# Patient Record
Sex: MALE | Race: WHITE | NOT HISPANIC OR LATINO | Employment: OTHER | URBAN - METROPOLITAN AREA
[De-identification: names, ages, dates, MRNs, and addresses within clinical notes are randomized per-mention and may not be internally consistent; named-entity substitution may affect disease eponyms.]

---

## 2019-07-01 ENCOUNTER — OFFICE VISIT (OUTPATIENT)
Dept: NEUROLOGY | Facility: CLINIC | Age: 79
End: 2019-07-01

## 2019-07-01 ENCOUNTER — LAB REQUISITION (OUTPATIENT)
Dept: LAB | Facility: HOSPITAL | Age: 79
End: 2019-07-01

## 2019-07-01 VITALS
HEIGHT: 73 IN | WEIGHT: 210 LBS | BODY MASS INDEX: 27.83 KG/M2 | SYSTOLIC BLOOD PRESSURE: 118 MMHG | DIASTOLIC BLOOD PRESSURE: 74 MMHG

## 2019-07-01 DIAGNOSIS — R26.89 IMBALANCE: ICD-10-CM

## 2019-07-01 DIAGNOSIS — Z00.00 ROUTINE GENERAL MEDICAL EXAMINATION AT A HEALTH CARE FACILITY: ICD-10-CM

## 2019-07-01 DIAGNOSIS — R25.1 TREMOR: Primary | ICD-10-CM

## 2019-07-01 PROCEDURE — 99204 OFFICE O/P NEW MOD 45 MIN: CPT | Performed by: PSYCHIATRY & NEUROLOGY

## 2019-07-01 PROCEDURE — 36415 COLL VENOUS BLD VENIPUNCTURE: CPT | Performed by: PSYCHIATRY & NEUROLOGY

## 2019-07-01 RX ORDER — PRIMIDONE 50 MG/1
100 TABLET ORAL NIGHTLY
Qty: 120 TABLET | Refills: 2 | Status: SHIPPED | OUTPATIENT
Start: 2019-07-01 | End: 2019-10-10 | Stop reason: SDUPTHER

## 2019-07-01 RX ORDER — CEPHALEXIN 500 MG/1
CAPSULE ORAL
COMMUNITY
Start: 2019-06-27 | End: 2020-01-24

## 2019-07-01 RX ORDER — DOXAZOSIN MESYLATE 4 MG/1
TABLET ORAL
COMMUNITY
Start: 2019-04-21

## 2019-07-01 RX ORDER — PILOCARPINE HYDROCHLORIDE 10 MG/ML
SOLUTION/ DROPS OPHTHALMIC
COMMUNITY
End: 2020-12-01

## 2019-07-01 RX ORDER — CHLORTHALIDONE 25 MG/1
TABLET ORAL
COMMUNITY
Start: 2019-06-17 | End: 2020-09-29

## 2019-07-01 RX ORDER — CLONAZEPAM 0.5 MG/1
TABLET ORAL
COMMUNITY
Start: 2019-05-30 | End: 2020-09-29

## 2019-07-01 RX ORDER — BIMATOPROST 0.01 %
DROPS OPHTHALMIC (EYE)
COMMUNITY
Start: 2019-06-04 | End: 2021-03-01

## 2019-07-01 RX ORDER — DORZOLAMIDE HYDROCHLORIDE AND TIMOLOL MALEATE 20; 5 MG/ML; MG/ML
SOLUTION/ DROPS OPHTHALMIC
COMMUNITY
Start: 2019-05-17

## 2019-07-01 RX ORDER — PAROXETINE 30 MG/1
TABLET, FILM COATED ORAL
COMMUNITY
Start: 2019-04-21

## 2019-07-01 NOTE — PROGRESS NOTES
Subjective:    CC: Justin Tovar is seen today in consultation at the request of Joel Arroyo MD for Tremors       HPI:  Patient is a 79-year-old male with past medical history of depression, hypertension referred to the clinic for evaluation of tremors.  He is accompanied by his wife and daughter who help with the history.  As per the patient, he started having tremors 30 years ago.  It initially started in his right hand then eventually involve his left hand.  In the last 4 years, it has become worse and also has started involving his head.  He reports that her tremors are worse when he is holding cup of coffee or glass of water.  He is unable to write sentences the way who could before.  He also has noted difficulty using spoon and fork.  He reports strong family history of tremors in his mother and also in another family member.  He does drink alcohol on a daily basis and reports that alcohol does help significantly in reducing intensity of tremors.  He denies any episodes of acting out dreams at night.  Denies any loss of sense of smell.  He does report difficulty with walking and has had frequent falls recently.    The following portions of the patient's history were reviewed today and updated as of 07/01/2019  : allergies, social history and problem list.  This document will be scanned to patient's chart.      Current Outpatient Medications:   •  brimonidine (ALPHAGAN P) 0.1 % solution ophthalmic solution, Alphagan P 0.1 % eye drops  PLACE ONE DROP IN EACH EYE THREE TIMES A DAY, Disp: , Rfl:   •  cephalexin (KEFLEX) 500 MG capsule, , Disp: , Rfl:   •  chlorthalidone (HYGROTON) 25 MG tablet, , Disp: , Rfl:   •  clonazePAM (KlonoPIN) 0.5 MG tablet, , Disp: , Rfl:   •  dorzolamide-timolol (COSOPT) 22.3-6.8 MG/ML ophthalmic solution, , Disp: , Rfl:   •  doxazosin (CARDURA) 4 MG tablet, , Disp: , Rfl:   •  LUMIGAN 0.01 % ophthalmic drops, , Disp: , Rfl:   •  PARoxetine (PAXIL) 30 MG tablet, , Disp: ,  "Rfl:   •  pilocarpine (PILOCAR) 1 % ophthalmic solution, pilocarpine 1 % eye drops  1 drop each eye 4 times a day, Disp: , Rfl:   •  primidone (MYSOLINE) 50 MG tablet, Take 2 tablets by mouth Every Night. Take half tablet at night for 7 days then 1 tablet at night for 7 days then 1.5 tablets, Disp: 120 tablet, Rfl: 2   Past Medical History:   Diagnosis Date   • Depression    • Glaucoma    • Hypertension       History reviewed. No pertinent surgical history.   Family History   Problem Relation Age of Onset   • Alzheimer's disease Mother    • Cancer Mother    • Dementia Mother    • Cancer Father    • Heart disease Father       Review of Systems   Constitutional: Positive for appetite change, fatigue, unexpected weight gain and unexpected weight loss.   Eyes: Positive for visual disturbance.   Cardiovascular: Positive for leg swelling.   Genitourinary: Positive for erectile dysfunction.   Musculoskeletal: Positive for gait problem and neck stiffness.   Neurological: Positive for dizziness, tremors, weakness, light-headedness and confusion.   Psychiatric/Behavioral: Positive for decreased concentration and depressed mood.       All other systems reviewed and are negative     Objective:    /74   Ht 185.4 cm (73\")   Wt 95.3 kg (210 lb)   BMI 27.71 kg/m²     Neurology Exam:    General apperance: NAD.     Mental status: Alert, awake and oriented to time place and person.    Recent and Remote memory: Can recall 3/3 objects at 5 minutes. Can recall historical events.     Attention span and Concentration: Serial 7s: Normal.     Fund of knowledge:  Normal.     Language and Speech: No aphasia or dysarthria.    Naming , Repitition and Comprehension:  Can name objects, repeat a sentence and follow commands. Speech is clear and fluent with good repetition, comprehension, and naming.    Cranial Nerves:   CN II: Visual fields are full. Intact. Fundi - Normal, No papillederma, Pupils - ANDRE  CN III, IV and VI: Extraocular " movements are intact. Normal saccades.   CN V: Facial sensation is intact.   CN VII: Muscles of facial expression reveal no asymmetry. Intact.   CN VIII: Hearing is intact. Whispered voice intact.   CN IX and X: Palate elevates symmetrically. Intact  CN XI: Shoulder shrug is intact.   CN XII: Tongue is midline without evidence of atrophy or fasciculation.     Motor:  Right UE muscle strength 5/5. Normal tone.     Left UE muscle strength 5/5. Normal tone.      Right LE muscle strength5/5. Normal tone.     Left LE muscle strength 5/5. Normal tone.      Sensory: Normal light touch, vibration and pinprick sensation bilaterally.    DTRs: 2+ bilaterally in upper and lower extremities.    Babinski: Negative bilaterally.     Co-ordination: Normal finger-to-nose, heel to shin B/L.  Moderate postural and  action tremors noted.  Head tremors and yes this fashion noted as well.    Rhomberg: Negative.    Gait: Normal.  Walks slowly but has normal arm swing.  Mildly stooped posture noted.    Cardiovascular: Regular rate and rhythm without murmur, gallop or rub.    Ophthalmoscopic exam: Normal fundi, no papilledema.    Assessment and Plan:  1. Tremor  Benign essential tremor.  We will start him on primidone 25 mg at bedtime slowly increasing 100 mg dose the next 4 weeks.  If no response and no side effects and it can be increased further.  I have advised him to stop using alcohol no more than once or twice in a week.  Because of daily alcohol use for last several months, I will be checking vitamin B12 levels.  If low then it will be replaced appropriately.  - Vitamin B12; Future    2. Imbalance  He is reporting problems with balance which is exacerbated by daily alcohol use.  I have advised him to reduce alcohol intake and then preferably stop it.  I will send referral who basically gait therapy to improve gait and walking.       Return in about 8 weeks (around 8/26/2019).     Matthew Squires MD

## 2019-07-02 LAB — VIT B12 SERPL-MCNC: 966 PG/ML (ref 232–1245)

## 2019-07-05 ENCOUNTER — TELEPHONE (OUTPATIENT)
Dept: NEUROLOGY | Facility: CLINIC | Age: 79
End: 2019-07-05

## 2019-07-06 ENCOUNTER — RESULTS ENCOUNTER (OUTPATIENT)
Dept: NEUROLOGY | Facility: CLINIC | Age: 79
End: 2019-07-06

## 2019-07-06 DIAGNOSIS — R25.1 TREMOR: ICD-10-CM

## 2019-07-10 ENCOUNTER — TELEPHONE (OUTPATIENT)
Dept: NEUROLOGY | Facility: CLINIC | Age: 79
End: 2019-07-10

## 2019-07-10 NOTE — TELEPHONE ENCOUNTER
[facility] in [place] scheduled 7/22 at 1:45 PM.    Pt sister Nanda was inquiring about pt Vitamin B12 results? She states that pt becomes dizzy when he gets up sometimes and she was curious to whether or not it could be related to low B12. Please advise.

## 2019-07-10 NOTE — TELEPHONE ENCOUNTER
His vitamin B12 levels are normal at 966.  Tell him to make sure that he is well-hydrated and if he is getting dizzy when he stands up then to wait for a few seconds before he takes his first step.

## 2019-07-10 NOTE — TELEPHONE ENCOUNTER
Informed sister Nanda that vitamin b12 was normal at 966. Advised to make sure pt states hydrated and he needs to wait a few seconds before getting up if he is having dizziness. Provided appt info for PT with [facility]

## 2019-10-09 ENCOUNTER — TELEPHONE (OUTPATIENT)
Dept: NEUROLOGY | Facility: CLINIC | Age: 79
End: 2019-10-09

## 2019-10-09 NOTE — TELEPHONE ENCOUNTER
----- Message from Harjit Gordon sent at 10/9/2019  1:20 PM EDT -----  Contact: [phone]  Dr. Squires,    Pt called after missing a conformation call in regards to his appt for tomorrow 10/10. Pt states he does not want to come in but wants to continue is Rx for Primidone, stating that it has been helpful, and asked if Dr. Squires was going to increase it because he would like an increase. I informed pt that I was not sure and that if he felt the need for an increase he may want to keep his appt and discuss this with Dr. Squires. Pt states he does not want to keep the appt, he just wanted me to ask Dr. Squires to increase the Rx and for me to call him back to tell him what Dr. Squires said. I informed pt that I would send a message to Dr. Squires and that his MA would call him back. Please advise.

## 2019-10-10 RX ORDER — PRIMIDONE 50 MG/1
200 TABLET ORAL NIGHTLY
Qty: 120 TABLET | Refills: 3 | Status: SHIPPED | OUTPATIENT
Start: 2019-10-10 | End: 2020-01-24 | Stop reason: SDUPTHER

## 2019-10-10 NOTE — TELEPHONE ENCOUNTER
Tell him to increase his primidone to 3 tablets at bedtime for 7 days then 4 tablets after that.  I am sending refill for 3 months but he should see me after 3 months for follow-up otherwise insurance will not approve the medication moving forward.

## 2019-10-10 NOTE — TELEPHONE ENCOUNTER
Called pt and informed pt to increase his primidone to 3 tablets at bedtime for 7 days then 4 tablets after that. Advised  has sent refill for 3 months but he should see me after 3 months for follow-up otherwise insurance will not approve the medication moving forward. Pt acknowledged understanding.

## 2019-12-05 ENCOUNTER — TELEPHONE (OUTPATIENT)
Dept: NEUROLOGY | Facility: CLINIC | Age: 79
End: 2019-12-05

## 2019-12-05 NOTE — TELEPHONE ENCOUNTER
Called and spoke with pt who states that someone from our office already scheduled a follow-up appointment for him.

## 2019-12-05 NOTE — TELEPHONE ENCOUNTER
----- Message from Harjit Gordon sent at 12/5/2019 11:20 AM EST -----  Contact: [phone]  Dr. Squires,    Pt left a vm asking for a call back. Please advise.

## 2020-01-24 ENCOUNTER — OFFICE VISIT (OUTPATIENT)
Dept: NEUROLOGY | Facility: CLINIC | Age: 80
End: 2020-01-24

## 2020-01-24 VITALS
WEIGHT: 204 LBS | SYSTOLIC BLOOD PRESSURE: 116 MMHG | OXYGEN SATURATION: 97 % | DIASTOLIC BLOOD PRESSURE: 76 MMHG | BODY MASS INDEX: 27.04 KG/M2 | HEIGHT: 73 IN | HEART RATE: 71 BPM

## 2020-01-24 DIAGNOSIS — R25.1 TREMOR: Primary | ICD-10-CM

## 2020-01-24 PROCEDURE — 99214 OFFICE O/P EST MOD 30 MIN: CPT | Performed by: PSYCHIATRY & NEUROLOGY

## 2020-01-24 RX ORDER — PRIMIDONE 50 MG/1
TABLET ORAL
Qty: 90 TABLET | Refills: 3 | Status: SHIPPED | OUTPATIENT
Start: 2020-01-24 | End: 2020-06-29

## 2020-01-24 RX ORDER — PRIMIDONE 250 MG/1
250 TABLET ORAL NIGHTLY
Qty: 30 TABLET | Refills: 3 | Status: SHIPPED | OUTPATIENT
Start: 2020-01-24 | End: 2020-05-20

## 2020-01-24 NOTE — PROGRESS NOTES
Subjective:    CC: Justin Tovar is in clinic today for follow up for benign essential tremors.      HPI:  He is in clinic for regular follow-up.  Since her last visit, he reports that he has been taking primidone and the dose has been increased to 200 mg at bedtime.  He reports that he has had very good response and overall intensity of tremors have reduced.  He reports that prior to starting primidone, he could not write but now with primidone on board, he is at least able to write.  He still has tremors but they have reduced in intensity.  He denies any side effects with primidone use.    The following portions of the patient's history were reviewed and updated as of 01/24/2020: allergies, social history and problem list.       Current Outpatient Medications:   •  brimonidine (ALPHAGAN P) 0.1 % solution ophthalmic solution, Alphagan P 0.1 % eye drops  PLACE ONE DROP IN EACH EYE THREE TIMES A DAY, Disp: , Rfl:   •  chlorthalidone (HYGROTON) 25 MG tablet, , Disp: , Rfl:   •  clonazePAM (KlonoPIN) 0.5 MG tablet, , Disp: , Rfl:   •  dorzolamide-timolol (COSOPT) 22.3-6.8 MG/ML ophthalmic solution, , Disp: , Rfl:   •  doxazosin (CARDURA) 4 MG tablet, , Disp: , Rfl:   •  LUMIGAN 0.01 % ophthalmic drops, , Disp: , Rfl:   •  PARoxetine (PAXIL) 30 MG tablet, , Disp: , Rfl:   •  pilocarpine (PILOCAR) 1 % ophthalmic solution, pilocarpine 1 % eye drops  1 drop each eye 4 times a day, Disp: , Rfl:   •  primidone (MYSOLINE) 50 MG tablet, Take 3 tablets in the morning., Disp: 90 tablet, Rfl: 3  •  primidone (MYSOLINE) 250 MG tablet, Take 1 tablet by mouth Every Night., Disp: 30 tablet, Rfl: 3   Past Medical History:   Diagnosis Date   • Depression    • Glaucoma    • Hypertension       History reviewed. No pertinent surgical history.   Family History   Problem Relation Age of Onset   • Alzheimer's disease Mother    • Cancer Mother    • Dementia Mother    • Cancer Father    • Heart disease Father         Review of Systems  "  Neurological: Positive for tremors.   All other systems reviewed and are negative.    Objective:    /76   Pulse 71   Ht 185.4 cm (73\")   Wt 92.5 kg (204 lb)   SpO2 97%   BMI 26.91 kg/m²     Neurology Exam:  General apperance: NAD.     Mental status: Alert, awake and oriented to time place and person.    Recent and Remote memory: Can recall 3/3 objects at 5 minutes. Can recall historical events.     Attention span and Concentration: Serial 7s: Normal.     Fund of knowledge:  Normal.     Language and Speech: No aphasia or dysarthria.    Naming , Repitition and Comprehension:  Can name objects, repeat a sentence and follow commands. Speech is clear and fluent with good repetition, comprehension, and naming.    CN II to XII: Intact.    Opthalmoscopic Exam: No papilledema.    Motor:  Right UE muscle strength 5/5. Normal tone.     Left UE muscle strength 5/5. Normal tone.      Right LE muscle strength5/5. Normal tone.     Left LE muscle strength 5/5. Normal tone.      Sensory: Normal light touch, vibration and pinprick sensation bilaterally.    DTRs: 2+ bilaterally.    Babinski: Negative bilaterally.    Co-ordination: Normal finger-to-nose, heel to shin B/L.  Postural and kinetic tremors noted bilaterally-reduce in intensity from last visit.  Head tremors in yes-yes fashion noted as well. reduced in intensity as compared to last visit.    Rhomberg: Negative.    Gait: Normal.    Cardiovascular: Regular rate and rhythm without murmur, gallop or rub.    Assessment and Plan:  1. Tremor  benign essential tremors.  He has had good response to primidone at nighttime.  Currently he is on 200 mg dose.  Since he does not have any side effects, it will be increased to 250 mg at bedtime and will introduce morning dose slowly increasing from 50 mg to 150 mg in next 3 weeeks.  This will even help reduce intensity of tremors more.  So the next visit, he should be continuing 150 mg in the morning and 250 mg at night.  I will " see him back in 3 months for follow-up.       I spent 25 minutes face to face with the patient and spent more than 50% of this time  in management, instructions and education regarding above mentioned diagnosis and also on counseling and discussing about taking medication regularly, possible side effects with medication use, importance of good sleep hygiene, good hydration and regular exercise.    Return in about 3 months (around 4/24/2020).

## 2020-05-20 RX ORDER — PRIMIDONE 250 MG/1
TABLET ORAL
Qty: 30 TABLET | Refills: 2 | Status: SHIPPED | OUTPATIENT
Start: 2020-05-20 | End: 2020-06-29 | Stop reason: SDUPTHER

## 2020-06-29 ENCOUNTER — OFFICE VISIT (OUTPATIENT)
Dept: NEUROLOGY | Facility: CLINIC | Age: 80
End: 2020-06-29

## 2020-06-29 VITALS
WEIGHT: 206 LBS | HEIGHT: 73 IN | SYSTOLIC BLOOD PRESSURE: 138 MMHG | HEART RATE: 72 BPM | OXYGEN SATURATION: 98 % | DIASTOLIC BLOOD PRESSURE: 92 MMHG | TEMPERATURE: 96.9 F | BODY MASS INDEX: 27.3 KG/M2

## 2020-06-29 DIAGNOSIS — R25.1 TREMOR: Primary | ICD-10-CM

## 2020-06-29 PROCEDURE — 99214 OFFICE O/P EST MOD 30 MIN: CPT | Performed by: PSYCHIATRY & NEUROLOGY

## 2020-06-29 RX ORDER — TEMAZEPAM 30 MG/1
CAPSULE ORAL
COMMUNITY
Start: 2020-06-16

## 2020-06-29 RX ORDER — PRIMIDONE 250 MG/1
250 TABLET ORAL 2 TIMES DAILY
Qty: 60 TABLET | Refills: 3 | Status: SHIPPED | OUTPATIENT
Start: 2020-06-29 | End: 2020-09-29 | Stop reason: SDUPTHER

## 2020-06-29 NOTE — PROGRESS NOTES
Subjective:    CC: Justin Tovar is in clinic today for follow up for benign essential tremors.    HPI:    1/24/2020: He is in clinic for regular follow-up.  Since her last visit, he reports that he has been taking primidone and the dose has been increased to 200 mg at bedtime.  He reports that he has had very good response and overall intensity of tremors have reduced.  He reports that prior to starting primidone, he could not write but now with primidone on board, he is at least able to write.  He still has tremors but they have reduced in intensity.  He denies any side effects with primidone use.    6/29/2020: He is in clinic for regular follow-up.  Since his last visit in January, he reports that he has been taking primidone 150 mg in the morning at 250 mg at night.  He did well initially for couple of months but lately he reports that the tremors in right hand as well as head tremors have become somewhat worse.  He denies any side effects with primidone use.    The following portions of the patient's history were reviewed and updated as of 06/29/2020: allergies, social history and problem list.       Current Outpatient Medications:   •  brimonidine (ALPHAGAN P) 0.1 % solution ophthalmic solution, Alphagan P 0.1 % eye drops  PLACE ONE DROP IN EACH EYE THREE TIMES A DAY, Disp: , Rfl:   •  chlorthalidone (HYGROTON) 25 MG tablet, , Disp: , Rfl:   •  dorzolamide-timolol (COSOPT) 22.3-6.8 MG/ML ophthalmic solution, , Disp: , Rfl:   •  doxazosin (CARDURA) 4 MG tablet, , Disp: , Rfl:   •  LUMIGAN 0.01 % ophthalmic drops, , Disp: , Rfl:   •  PARoxetine (PAXIL) 30 MG tablet, , Disp: , Rfl:   •  pilocarpine (PILOCAR) 1 % ophthalmic solution, pilocarpine 1 % eye drops  1 drop each eye 4 times a day, Disp: , Rfl:   •  primidone (MYSOLINE) 250 MG tablet, Take 1 tablet by mouth 2 (two) times a day., Disp: 60 tablet, Rfl: 3  •  temazepam (RESTORIL) 30 MG capsule, , Disp: , Rfl:   •  clonazePAM (KlonoPIN) 0.5 MG tablet, ,  "Disp: , Rfl:    Past Medical History:   Diagnosis Date   • Depression    • Glaucoma    • Hypertension       No past surgical history on file.   Family History   Problem Relation Age of Onset   • Alzheimer's disease Mother    • Cancer Mother    • Dementia Mother    • Cancer Father    • Heart disease Father         Review of Systems   Constitutional: Negative.    HENT: Negative.    Eyes: Negative.    Respiratory: Negative.    Cardiovascular: Negative.    Gastrointestinal: Negative.    Endocrine: Negative.    Musculoskeletal: Negative.    Skin: Negative.    Allergic/Immunologic: Negative.    Neurological: Positive for tremors.   Hematological: Negative.    Psychiatric/Behavioral: Negative.      Objective:    /92   Pulse 72   Temp 96.9 °F (36.1 °C)   Ht 185.4 cm (73\")   Wt 93.4 kg (206 lb)   SpO2 98%   BMI 27.18 kg/m²     Neurology Exam:  General apperance: NAD.     Mental status: Alert, awake and oriented to time place and person.    Recent and Remote memory: Can recall 3/3 objects at 5 minutes. Can recall historical events.     Attention span and Concentration: Serial 7s: Normal.     Fund of knowledge:  Normal.     Language and Speech: No aphasia or dysarthria.    Naming , Repitition and Comprehension:  Can name objects, repeat a sentence and follow commands. Speech is clear and fluent with good repetition, comprehension, and naming.    CN II to XII: Intact.    Opthalmoscopic Exam: No papilledema.    Motor:  Right UE muscle strength 5/5. Normal tone.     Left UE muscle strength 5/5. Normal tone.      Right LE muscle strength5/5. Normal tone.     Left LE muscle strength 5/5. Normal tone.      Sensory: Normal light touch, vibration and pinprick sensation bilaterally.    DTRs: 2+ bilaterally.    Babinski: Negative bilaterally.    Co-ordination: Normal finger-to-nose, heel to shin B/L.  Moderate postural and action tremors noted in the right hand.  Mild postural and action tremors noted involving the left " hand.  Moderate intensity head tremors noted as well.    Rhomberg: Negative.    Gait: Normal.    Cardiovascular: Regular rate and rhythm without murmur, gallop or rub.    Assessment and Plan:  1. Tremor  Benign essential tremors.  Tremor seems to be worse involving the right hand and also involving the head.  Since he does not have side effects, the morning dose will be increased to 250 mg and will continue with 250 mg nightly dose.  I have advised him to call office with response in 2 to 3 weeks and based on the response, further dose adjustments will be made.  In future, afternoon dose may have to be introduced based on the current intensity of his tremors.  I plan to see him back in 3 months for follow-up.       I spent 25 minutes face to face with the patient and spent more than 50% of this time  in management, instructions and education regarding above mentioned diagnosis and also on counseling and discussing about taking medication regularly, possible side effects with medication use, importance of good sleep hygiene, good hydration and regular exercise.    Return in about 3 months (around 9/29/2020).

## 2020-09-29 ENCOUNTER — OFFICE VISIT (OUTPATIENT)
Dept: NEUROLOGY | Facility: CLINIC | Age: 80
End: 2020-09-29

## 2020-09-29 VITALS
HEART RATE: 59 BPM | WEIGHT: 188.2 LBS | DIASTOLIC BLOOD PRESSURE: 82 MMHG | SYSTOLIC BLOOD PRESSURE: 128 MMHG | BODY MASS INDEX: 24.94 KG/M2 | OXYGEN SATURATION: 98 % | HEIGHT: 73 IN | TEMPERATURE: 97.3 F

## 2020-09-29 DIAGNOSIS — R25.1 TREMOR: Primary | ICD-10-CM

## 2020-09-29 PROCEDURE — 99214 OFFICE O/P EST MOD 30 MIN: CPT | Performed by: PSYCHIATRY & NEUROLOGY

## 2020-09-29 RX ORDER — BUPROPION HYDROCHLORIDE 150 MG/1
TABLET ORAL
COMMUNITY
Start: 2020-08-31

## 2020-09-29 RX ORDER — SODIUM FLUORIDE 6 MG/ML
PASTE, DENTIFRICE DENTAL
COMMUNITY
Start: 2020-08-24 | End: 2021-07-20 | Stop reason: SDUPTHER

## 2020-09-29 RX ORDER — PRIMIDONE 50 MG/1
TABLET ORAL
Qty: 60 TABLET | Refills: 3 | Status: SHIPPED | OUTPATIENT
Start: 2020-09-29 | End: 2020-12-01

## 2020-09-29 RX ORDER — PRIMIDONE 250 MG/1
250 TABLET ORAL 2 TIMES DAILY
Qty: 60 TABLET | Refills: 3 | Status: SHIPPED | OUTPATIENT
Start: 2020-09-29 | End: 2021-02-17

## 2020-09-29 NOTE — PROGRESS NOTES
Subjective:    CC: Justin Tovar is in clinic today for follow up for benign essential tremors.    HPI:  1/24/2020: He is in clinic for regular follow-up.  Since her last visit, he reports that he has been taking primidone and the dose has been increased to 200 mg at bedtime.  He reports that he has had very good response and overall intensity of tremors have reduced.  He reports that prior to starting primidone, he could not write but now with primidone on board, he is at least able to write.  He still has tremors but they have reduced in intensity.  He denies any side effects with primidone use.    6/29/2020: He is in clinic for regular follow-up.  Since his last visit in January, he reports that he has been taking primidone 150 mg in the morning at 250 mg at night.  He did well initially for couple of months but lately he reports that the tremors in right hand as well as head tremors have become somewhat worse.  He denies any side effects with primidone use.    9/29/2020: He is in clinic for regular follow-up.  Since her last visit 3 months ago, he now is taking the primidone 250 mg in the morning and 250 mg at night.  Even after increasing the dose of primidone, he reports that the tremors has not changed much.  He continues to have difficulty with writing, difficulty when holding a cup of coffee a glass of water.  Continues to have moderate intensity head tremors as well.  He denies any side effects with primidone use.     The following portions of the patient's history were reviewed and updated as of 09/29/2020: allergies, social history and problem list.       Current Outpatient Medications:   •  brimonidine (ALPHAGAN P) 0.1 % solution ophthalmic solution, Alphagan P 0.1 % eye drops  PLACE ONE DROP IN EACH EYE THREE TIMES A DAY, Disp: , Rfl:   •  buPROPion XL (WELLBUTRIN XL) 150 MG 24 hr tablet, , Disp: , Rfl:   •  dorzolamide-timolol (COSOPT) 22.3-6.8 MG/ML ophthalmic solution, , Disp: , Rfl:   •   "doxazosin (CARDURA) 4 MG tablet, , Disp: , Rfl:   •  LUMIGAN 0.01 % ophthalmic drops, , Disp: , Rfl:   •  PARoxetine (PAXIL) 30 MG tablet, , Disp: , Rfl:   •  pilocarpine (PILOCAR) 1 % ophthalmic solution, pilocarpine 1 % eye drops  1 drop each eye 4 times a day, Disp: , Rfl:   •  primidone (MYSOLINE) 250 MG tablet, Take 1 tablet by mouth 2 (two) times a day., Disp: 60 tablet, Rfl: 3  •  Sodium Fluoride 5000 PPM 1.1 % paste, , Disp: , Rfl:   •  temazepam (RESTORIL) 30 MG capsule, , Disp: , Rfl:   •  primidone (MYSOLINE) 50 MG tablet, Take 2 tabs at noon., Disp: 60 tablet, Rfl: 3   Past Medical History:   Diagnosis Date   • Depression    • Glaucoma    • Hypertension       History reviewed. No pertinent surgical history.   Family History   Problem Relation Age of Onset   • Alzheimer's disease Mother    • Cancer Mother    • Dementia Mother    • Cancer Father    • Heart disease Father         Review of Systems   Constitutional: Negative.    HENT: Negative.    Eyes: Negative.    Respiratory: Negative.    Cardiovascular: Negative.    Gastrointestinal: Negative.    Endocrine: Negative.    Genitourinary: Negative.    Musculoskeletal: Negative.    Skin: Negative.    Allergic/Immunologic: Negative.    Neurological: Negative.    Hematological: Negative.    Psychiatric/Behavioral: Negative.      Objective:    /82   Pulse 59   Temp 97.3 °F (36.3 °C)   Ht 185.4 cm (72.99\")   Wt 85.4 kg (188 lb 3.2 oz)   SpO2 98%   BMI 24.84 kg/m²     Neurology Exam:  General apperance: NAD.     Mental status: Alert, awake and oriented to time place and person.    Recent and Remote memory: Can recall 3/3 objects at 5 minutes. Can recall historical events.     Attention span and Concentration: Serial 7s: Normal.     Fund of knowledge:  Normal.     Language and Speech: No aphasia or dysarthria.    Naming , Repitition and Comprehension:  Can name objects, repeat a sentence and follow commands. Speech is clear and fluent with good " repetition, comprehension, and naming.    CN II to XII: Intact.    Opthalmoscopic Exam: No papilledema.    Motor:  Right UE muscle strength 5/5. Normal tone.     Left UE muscle strength 5/5. Normal tone.      Right LE muscle strength5/5. Normal tone.     Left LE muscle strength 5/5. Normal tone.      Sensory: Normal light touch, vibration and pinprick sensation bilaterally.    DTRs: 2+ bilaterally.    Babinski: Negative bilaterally.    Co-ordination: Normal finger-to-nose, heel to shin B/L.  Moderate intensity hand tremors bilaterally-worse on the right than on the left and head tremors noted.    Rhomberg: Negative.    Gait: Normal.    Cardiovascular: Regular rate and rhythm without murmur, gallop or rub.    Assessment and Plan:  1. Tremor  -Both hand tremors and head tremors remains unchanged.  Since he does not have any side effects with primidone use, I will add primidone 100 mg at noon and will continue with 250 mg in the morning and 250 mg at night.  I plan to see him back in 3 months and at that time, if no response then may have to add either Topamax or propranolol to help with tremors.       I spent 25 minutes face to face with the patient and spent more than 50% of this time  in management, instructions and education regarding above mentioned diagnosis and also on counseling and discussing about taking medication regularly, possible side effects with medication use, importance of good sleep hygiene, good hydration and regular exercise.    Return in about 3 months (around 12/29/2020).

## 2020-12-01 ENCOUNTER — OFFICE VISIT (OUTPATIENT)
Dept: NEUROLOGY | Facility: CLINIC | Age: 80
End: 2020-12-01

## 2020-12-01 VITALS
BODY MASS INDEX: 25.31 KG/M2 | HEIGHT: 73 IN | OXYGEN SATURATION: 98 % | DIASTOLIC BLOOD PRESSURE: 80 MMHG | HEART RATE: 88 BPM | SYSTOLIC BLOOD PRESSURE: 140 MMHG | WEIGHT: 191 LBS | TEMPERATURE: 97.1 F

## 2020-12-01 DIAGNOSIS — R25.1 TREMOR: Primary | ICD-10-CM

## 2020-12-01 PROCEDURE — 99214 OFFICE O/P EST MOD 30 MIN: CPT | Performed by: PSYCHIATRY & NEUROLOGY

## 2020-12-01 RX ORDER — NETARSUDIL 0.2 MG/ML
SOLUTION/ DROPS OPHTHALMIC; TOPICAL
COMMUNITY
End: 2021-03-01

## 2020-12-01 NOTE — PROGRESS NOTES
Subjective:    CC: Justin Tovar is in clinic today for follow up for      HPI:  1/24/2020: He is in clinic for regular follow-up.  Since her last visit, he reports that he has been taking primidone and the dose has been increased to 200 mg at bedtime.  He reports that he has had very good response and overall intensity of tremors have reduced.  He reports that prior to starting primidone, he could not write but now with primidone on board, he is at least able to write.  He still has tremors but they have reduced in intensity.  He denies any side effects with primidone use.    6/29/2020: He is in clinic for regular follow-up.  Since his last visit in January, he reports that he has been taking primidone 150 mg in the morning at 250 mg at night.  He did well initially for couple of months but lately he reports that the tremors in right hand as well as head tremors have become somewhat worse.  He denies any side effects with primidone use.    9/29/2020: He is in clinic for regular follow-up.  Since her last visit 3 months ago, he now is taking the primidone 250 mg in the morning and 250 mg at night.  Even after increasing the dose of primidone, he reports that the tremors has not changed much.  He continues to have difficulty with writing, difficulty when holding a cup of coffee a glass of water.  Continues to have moderate intensity head tremors as well.  He denies any side effects with primidone use.    12/1/2020: He is in clinic for regular follow-up.  Since her last visit, he is now taking primidone to 50 mg in the morning, 100 mg in the afternoon and 250 mg at night.  He reports that even with this dose, there has been no change in head or hand tremors.  He is having extreme difficulty using hands well holding cup of coffee, glass of water, handwriting as well as using spoon or fork.    The following portions of the patient's history were reviewed and updated as of 12/01/2020: allergies, social history and  "problem list.       Current Outpatient Medications:   •  brimonidine (ALPHAGAN P) 0.1 % solution ophthalmic solution, Alphagan P 0.1 % eye drops  PLACE ONE DROP IN EACH EYE THREE TIMES A DAY, Disp: , Rfl:   •  buPROPion XL (WELLBUTRIN XL) 150 MG 24 hr tablet, , Disp: , Rfl:   •  dorzolamide-timolol (COSOPT) 22.3-6.8 MG/ML ophthalmic solution, , Disp: , Rfl:   •  doxazosin (CARDURA) 4 MG tablet, , Disp: , Rfl:   •  LUMIGAN 0.01 % ophthalmic drops, , Disp: , Rfl:   •  Netarsudil Dimesylate (Rhopressa) 0.02 % solution, Rhopressa 0.02 % eye drops  1 drop both eyes in evening, Disp: , Rfl:   •  PARoxetine (PAXIL) 30 MG tablet, , Disp: , Rfl:   •  primidone (MYSOLINE) 250 MG tablet, Take 1 tablet by mouth 2 (two) times a day., Disp: 60 tablet, Rfl: 3  •  Sodium Fluoride 5000 PPM 1.1 % paste, , Disp: , Rfl:   •  temazepam (RESTORIL) 30 MG capsule, , Disp: , Rfl:    Past Medical History:   Diagnosis Date   • Depression    • Glaucoma    • Hypertension       History reviewed. No pertinent surgical history.   Family History   Problem Relation Age of Onset   • Alzheimer's disease Mother    • Cancer Mother    • Dementia Mother    • Cancer Father    • Heart disease Father         Review of Systems   Constitutional: Negative.    HENT: Negative.    Eyes: Negative.    Respiratory: Negative.    Cardiovascular: Negative.    Gastrointestinal: Negative.    Endocrine: Negative.    Genitourinary: Negative.    Musculoskeletal: Negative.    Skin: Negative.    Allergic/Immunologic: Negative.    Neurological: Negative.    Hematological: Negative.    Psychiatric/Behavioral: Negative.      Objective:    /80   Pulse 88   Temp 97.1 °F (36.2 °C)   Ht 185.4 cm (72.99\")   Wt 86.6 kg (191 lb)   SpO2 98%   BMI 25.20 kg/m²     Neurology Exam:  General apperance: NAD.     Mental status: Alert, awake and oriented to time place and person.    Recent and Remote memory: Can recall 3/3 objects at 5 minutes. Can recall historical events. "     Attention span and Concentration: Serial 7s: Normal.     Fund of knowledge:  Normal.     Language and Speech: No aphasia or dysarthria.    Naming , Repitition and Comprehension:  Can name objects, repeat a sentence and follow commands. Speech is clear and fluent with good repetition, comprehension, and naming.    CN II to XII: Intact.    Opthalmoscopic Exam: No papilledema.    Motor:  Right UE muscle strength 5/5. Normal tone.     Left UE muscle strength 5/5. Normal tone.      Right LE muscle strength5/5. Normal tone.     Left LE muscle strength 5/5. Normal tone.      Sensory: Normal light touch, vibration and pinprick sensation bilaterally.    DTRs: 2+ bilaterally.    Babinski: Negative bilaterally.    Co-ordination: Normal finger-to-nose, heel to shin B/L.  Moderate to severe head and bilateral hand action and postural tremors noted.    Rhomberg: Negative.    Gait: Normal.    Cardiovascular: Regular rate and rhythm without murmur, gallop or rub.    Assessment and Plan:  1. Tremor  Intractable benign essential tremors.  He is currently on 600 mg daily dose of primidone without any reduction in tremors.  I am going to refer him to Dr. Mccormack in neurosurgery/movement disorder clinic at  for evaluation of DBS placement for treatment of intractable benign essential tremors.  Until then continue with primidone as it is and I will see him back in 3 months for follow-up.  - Ambulatory Referral to Neurosurgery       I spent 25 minutes face to face with the patient and spent more than 50% of this time  in management, instructions and education regarding above mentioned diagnosis and also on counseling and discussing about taking medication regularly, possible side effects with medication use, importance of good sleep hygiene, good hydration and regular exercise.    No follow-ups on file.

## 2021-02-17 RX ORDER — PRIMIDONE 250 MG/1
TABLET ORAL
Qty: 120 TABLET | Refills: 2 | Status: SHIPPED | OUTPATIENT
Start: 2021-02-17 | End: 2021-04-05 | Stop reason: SDUPTHER

## 2021-03-01 ENCOUNTER — OFFICE VISIT (OUTPATIENT)
Dept: NEUROLOGY | Facility: CLINIC | Age: 81
End: 2021-03-01

## 2021-03-01 VITALS
DIASTOLIC BLOOD PRESSURE: 82 MMHG | SYSTOLIC BLOOD PRESSURE: 138 MMHG | HEART RATE: 73 BPM | TEMPERATURE: 97.8 F | OXYGEN SATURATION: 98 %

## 2021-03-01 DIAGNOSIS — R26.89 IMBALANCE: ICD-10-CM

## 2021-03-01 DIAGNOSIS — R25.1 TREMOR: Primary | ICD-10-CM

## 2021-03-01 PROCEDURE — 99214 OFFICE O/P EST MOD 30 MIN: CPT | Performed by: PSYCHIATRY & NEUROLOGY

## 2021-03-01 RX ORDER — PROPRANOLOL HYDROCHLORIDE 20 MG/1
20 TABLET ORAL 3 TIMES DAILY
Qty: 90 TABLET | Refills: 3 | Status: SHIPPED | OUTPATIENT
Start: 2021-03-01 | End: 2021-07-12

## 2021-03-01 NOTE — PROGRESS NOTES
Subjective:    CC: Justin Tovar is in clinic today for follow up for tremors.    HPI:  1/24/2020: He is in clinic for regular follow-up.  Since her last visit, he reports that he has been taking primidone and the dose has been increased to 200 mg at bedtime.  He reports that he has had very good response and overall intensity of tremors have reduced.  He reports that prior to starting primidone, he could not write but now with primidone on board, he is at least able to write.  He still has tremors but they have reduced in intensity.  He denies any side effects with primidone use.    6/29/2020: He is in clinic for regular follow-up.  Since his last visit in January, he reports that he has been taking primidone 150 mg in the morning at 250 mg at night.  He did well initially for couple of months but lately he reports that the tremors in right hand as well as head tremors have become somewhat worse.  He denies any side effects with primidone use.    9/29/2020: He is in clinic for regular follow-up.  Since her last visit 3 months ago, he now is taking the primidone 250 mg in the morning and 250 mg at night.  Even after increasing the dose of primidone, he reports that the tremors has not changed much.  He continues to have difficulty with writing, difficulty when holding a cup of coffee a glass of water.  Continues to have moderate intensity head tremors as well.  He denies any side effects with primidone use.    12/1/2020: He is in clinic for regular follow-up.  Since her last visit, he is now taking primidone 250 mg in the morning, 100 mg in the afternoon and 250 mg at night.  He reports that even with this dose, there has been no change in head or hand tremors.  He is having extreme difficulty using hands well holding cup of coffee, glass of water, handwriting as well as using spoon or fork.    3/1/2021: He is in clinic for regular follow-up.  Since his last visit in December, I did send referral to [facility]  neurosurgery for possible deep brain stimulation procedure to help with treatment resistant tremors however he decided not to have the procedure done so he did not go for consultation.  He reports that for the most part with primidone 250 mg 3 times a day dose, he can function throughout the day and can maintain ADLs.  He denies any side effects with primidone use.    The following portions of the patient's history were reviewed and updated as of 03/01/2021: allergies, social history and problem list.       Current Outpatient Medications:   •  brimonidine (ALPHAGAN P) 0.1 % solution ophthalmic solution, Alphagan P 0.1 % eye drops  PLACE ONE DROP IN EACH EYE THREE TIMES A DAY, Disp: , Rfl:   •  buPROPion XL (WELLBUTRIN XL) 150 MG 24 hr tablet, , Disp: , Rfl:   •  dorzolamide-timolol (COSOPT) 22.3-6.8 MG/ML ophthalmic solution, , Disp: , Rfl:   •  doxazosin (CARDURA) 4 MG tablet, , Disp: , Rfl:   •  PARoxetine (PAXIL) 30 MG tablet, , Disp: , Rfl:   •  primidone (MYSOLINE) 250 MG tablet, TAKE ONE TABLET BY MOUTH TWICE A DAY, Disp: 120 tablet, Rfl: 2  •  Sodium Fluoride 5000 PPM 1.1 % paste, , Disp: , Rfl:   •  temazepam (RESTORIL) 30 MG capsule, , Disp: , Rfl:    Past Medical History:   Diagnosis Date   • Depression    • Glaucoma    • Hypertension       History reviewed. No pertinent surgical history.   Family History   Problem Relation Age of Onset   • Alzheimer's disease Mother    • Cancer Mother    • Dementia Mother    • Cancer Father    • Heart disease Father         Review of Systems   Constitutional: Negative.    HENT: Negative.    Eyes: Negative.    Respiratory: Negative.    Cardiovascular: Negative.    Gastrointestinal: Negative.    Endocrine: Negative.    Genitourinary: Negative.    Musculoskeletal: Negative.    Skin: Negative.    Allergic/Immunologic: Negative.    Neurological: Negative.    Hematological: Negative.    Psychiatric/Behavioral: Negative.      Objective:    /82   Pulse 73   Temp 97.8 °F  (36.6 °C)   SpO2 98%     Neurology Exam:  General apperance: NAD.     Mental status: Alert, awake and oriented to time place and person.    Recent and Remote memory: Can recall 3/3 objects at 5 minutes. Can recall historical events.     Attention span and Concentration: Serial 7s: Normal.     Fund of knowledge:  Normal.     Language and Speech: No aphasia or dysarthria.    Naming , Repitition and Comprehension:  Can name objects, repeat a sentence and follow commands. Speech is clear and fluent with good repetition, comprehension, and naming.    CN II to XII: Intact.    Opthalmoscopic Exam: No papilledema.    Motor:  Right UE muscle strength 5/5. Normal tone.     Left UE muscle strength 5/5. Normal tone.      Right LE muscle strength5/5. Normal tone.     Left LE muscle strength 5/5. Normal tone.      Sensory: Normal light touch, vibration and pinprick sensation bilaterally.    DTRs: 2+ bilaterally.    Babinski: Negative bilaterally.    Co-ordination: Normal finger-to-nose, heel to shin B/L.  Moderate intensity tremors in hand and head noted.    Rhomberg: Negative.    Gait: Normal.    Cardiovascular: Regular rate and rhythm without murmur, gallop or rub.    Assessment and Plan:  1. Tremor  2. Imbalance  Benign essential tremors.  He is currently on primidone 250 mg 3 times daily dose.  He still has moderate intensity tremors in both his hands and head.  On the last visit, neurosurgery referral was done but he change his mind and decided not to have a DBS for tremors he reports that for the most part, he is able to function throughout the day.  Since he has not tried propranolol in the past, I will be introducing propranolol 20 mg 3 times daily in addition to primidone and see how he does.  I have advised him to check blood pressure regularly while on propranolol and based on the response, further dose adjustment will be made in future.  I will plan to see him back in 3 months for follow-up.    I spent 25 minutes  face to face with the patient and spent more than 50% of this time  in management, instructions and education regarding above mentioned diagnosis and also on counseling and discussing about taking medication regularly, possible side effects with medication use, importance of good sleep hygiene, good hydration and regular exercise.    Return in about 3 months (around 6/1/2021).

## 2021-04-02 RX ORDER — PRIMIDONE 50 MG/1
TABLET ORAL
Qty: 60 TABLET | Refills: 2 | OUTPATIENT
Start: 2021-04-02

## 2021-04-05 ENCOUNTER — TELEPHONE (OUTPATIENT)
Dept: NEUROLOGY | Facility: CLINIC | Age: 81
End: 2021-04-05

## 2021-04-05 RX ORDER — PRIMIDONE 250 MG/1
250 TABLET ORAL 3 TIMES DAILY
Qty: 270 TABLET | Refills: 1 | Status: SHIPPED | OUTPATIENT
Start: 2021-04-05 | End: 2021-10-07

## 2021-04-05 NOTE — TELEPHONE ENCOUNTER
Caller: Justin Tovar    Relationship: Self    Best call back number: [phone]    What medications are you currently taking:   Current Outpatient Medications on File Prior to Visit   Medication Sig Dispense Refill   • brimonidine (ALPHAGAN P) 0.1 % solution ophthalmic solution Alphagan P 0.1 % eye drops   PLACE ONE DROP IN EACH EYE THREE TIMES A DAY     • buPROPion XL (WELLBUTRIN XL) 150 MG 24 hr tablet      • dorzolamide-timolol (COSOPT) 22.3-6.8 MG/ML ophthalmic solution      • doxazosin (CARDURA) 4 MG tablet      • PARoxetine (PAXIL) 30 MG tablet      • primidone (MYSOLINE) 250 MG tablet TAKE ONE TABLET BY MOUTH TWICE A  tablet 2   • propranolol (INDERAL) 20 MG tablet Take 1 tablet by mouth 3 (Three) Times a Day for 30 days. 90 tablet 3   • Sodium Fluoride 5000 PPM 1.1 % paste      • temazepam (RESTORIL) 30 MG capsule        No current facility-administered medications on file prior to visit.        When did you start taking these medications: NA     Which medication are you concerned about: PRIMIDONE 50MG    Who prescribed you this medication:     What are your concerns: PATIENT IS ASKING TO REFILL PRIMIDONE 50MG NOT 250MG. I DID NOT SEE THIS IN THE LIST. PLEASE ADVISE.     How long have you been taking these medications: NA    How long have you had these concerns: NA

## 2021-04-05 NOTE — TELEPHONE ENCOUNTER
I just checked my notes.  50 mg is a typo so I have changed to 250 mg in my note.  Also I am sending primidone 250 mg 1 tablet to be taken 3 times a day refills to his pharmacy.  Thanks

## 2021-04-05 NOTE — TELEPHONE ENCOUNTER
It looks like he is taking the 250 mg but in the last note it also states 50mg. I do not see it in medication list. Did you want him to still take that?

## 2021-04-06 NOTE — TELEPHONE ENCOUNTER
Caller: Justin Tovar    Relationship to patient: Self    Best call back number: [phone]    Patient is needing: CLARIFICATION ON MEDICATION DOSAGE

## 2021-04-26 ENCOUNTER — TELEPHONE (OUTPATIENT)
Dept: NEUROLOGY | Facility: CLINIC | Age: 81
End: 2021-04-26

## 2021-04-26 NOTE — TELEPHONE ENCOUNTER
Provider: DR PARKER    Caller: ALEX PACHECO    Relationship to Patient: SELF    Phone Number: [phone]    Reason for Call: THE PT CALLED IN TODAY BECAUSE HE IS NEEDING DR PARKER TO CLARIFY ON THE INSTRUCTIONS FOR HIS ROPINIROLE MEDICATION. HE STATED THE INSTRUCTIONS WANT HIM TO TAKE ONE TABLET BY MOUTH 3 TIMES A DAY AND HE IS WANTING TO KNOW EXACTLY WHEN HE IS SUPPOSE TO TAKE EACH TABLET TODAY. FOR EXAMPLE MORNING, NOON, AND NIGHT. PLEASE GIVE HIM A CALL TO DISCUSS THIS WITH HIM.

## 2021-07-12 RX ORDER — PROPRANOLOL HYDROCHLORIDE 20 MG/1
TABLET ORAL
Qty: 90 TABLET | Refills: 2 | Status: SHIPPED | OUTPATIENT
Start: 2021-07-12 | End: 2021-10-07

## 2021-07-20 ENCOUNTER — OFFICE VISIT (OUTPATIENT)
Dept: NEUROLOGY | Facility: CLINIC | Age: 81
End: 2021-07-20

## 2021-07-20 VITALS
DIASTOLIC BLOOD PRESSURE: 86 MMHG | OXYGEN SATURATION: 96 % | WEIGHT: 188 LBS | SYSTOLIC BLOOD PRESSURE: 144 MMHG | HEIGHT: 73 IN | HEART RATE: 70 BPM | BODY MASS INDEX: 24.92 KG/M2

## 2021-07-20 DIAGNOSIS — R26.89 IMBALANCE: ICD-10-CM

## 2021-07-20 DIAGNOSIS — R25.1 TREMOR: Primary | ICD-10-CM

## 2021-07-20 PROCEDURE — 99214 OFFICE O/P EST MOD 30 MIN: CPT | Performed by: PSYCHIATRY & NEUROLOGY

## 2021-07-20 RX ORDER — NETARSUDIL 0.2 MG/ML
SOLUTION/ DROPS OPHTHALMIC; TOPICAL
COMMUNITY
Start: 2021-07-07 | End: 2021-07-20 | Stop reason: SDUPTHER

## 2021-07-20 RX ORDER — 1.1% SODIUM FLUORIDE 11 MG/G
GEL DENTAL
COMMUNITY
Start: 2021-07-13

## 2021-07-20 RX ORDER — BIMATOPROST 0.01 %
DROPS OPHTHALMIC (EYE)
COMMUNITY
Start: 2021-06-03

## 2021-07-20 NOTE — PROGRESS NOTES
Subjective:    CC: Justin Tovar is in clinic today for follow up for benign essential tremors.    HPI:  1/24/2020: He is in clinic for regular follow-up.  Since her last visit, he reports that he has been taking primidone and the dose has been increased to 200 mg at bedtime.  He reports that he has had very good response and overall intensity of tremors have reduced.  He reports that prior to starting primidone, he could not write but now with primidone on board, he is at least able to write.  He still has tremors but they have reduced in intensity.  He denies any side effects with primidone use.    6/29/2020: He is in clinic for regular follow-up.  Since his last visit in January, he reports that he has been taking primidone 150 mg in the morning at 250 mg at night.  He did well initially for couple of months but lately he reports that the tremors in right hand as well as head tremors have become somewhat worse.  He denies any side effects with primidone use.    9/29/2020: He is in clinic for regular follow-up.  Since her last visit 3 months ago, he now is taking the primidone 250 mg in the morning and 250 mg at night.  Even after increasing the dose of primidone, he reports that the tremors has not changed much.  He continues to have difficulty with writing, difficulty when holding a cup of coffee a glass of water.  Continues to have moderate intensity head tremors as well.  He denies any side effects with primidone use.    12/1/2020: He is in clinic for regular follow-up.  Since her last visit, he is now taking primidone 250 mg in the morning, 100 mg in the afternoon and 250 mg at night.  He reports that even with this dose, there has been no change in head or hand tremors.  He is having extreme difficulty using hands well holding cup of coffee, glass of water, handwriting as well as using spoon or fork.    3/1/2021: He is in clinic for regular follow-up.  Since his last visit in December, I did send referral  to [facility] for possible deep brain stimulation procedure to help with treatment resistant tremors however he decided not to have the procedure done so he did not go for consultation.  He reports that for the most part with primidone 250 mg 3 times a day dose, he can function throughout the day and can maintain ADLs.  He denies any side effects with primidone use.    Follow-up: 2021-07-20: He is in clinic for regular follow-up.  Since his last visit in March, he reports that he has been taking primidone 250 mg 3 times a day along with propranolol 20 mg 3 times daily.  With addition of propranolol, he has not noticed much difference but overall he feels that with current combination, tremors are controlled enough to do activities of daily living.  He does have trouble writing but otherwise he does okay throughout the day.  He denies any side effects with propranolol use.    The following portions of the patient's history were reviewed and updated as of 07/20/2021: allergies, social history and problem list.       Current Outpatient Medications:   •  brimonidine (ALPHAGAN P) 0.1 % solution ophthalmic solution, Alphagan P 0.1 % eye drops  PLACE ONE DROP IN EACH EYE THREE TIMES A DAY, Disp: , Rfl:   •  buPROPion XL (WELLBUTRIN XL) 150 MG 24 hr tablet, , Disp: , Rfl:   •  dorzolamide-timolol (COSOPT) 22.3-6.8 MG/ML ophthalmic solution, , Disp: , Rfl:   •  doxazosin (CARDURA) 4 MG tablet, , Disp: , Rfl:   •  Lumigan 0.01 % ophthalmic drops, , Disp: , Rfl:   •  PARoxetine (PAXIL) 30 MG tablet, , Disp: , Rfl:   •  primidone (MYSOLINE) 250 MG tablet, Take 1 tablet by mouth 3 (Three) Times a Day., Disp: 270 tablet, Rfl: 1  •  propranolol (INDERAL) 20 MG tablet, TAKE ONE TABLET BY MOUTH THREE TIMES A DAY, Disp: 90 tablet, Rfl: 2  •  SF 1.1 % gel, , Disp: , Rfl:   •  temazepam (RESTORIL) 30 MG capsule, , Disp: , Rfl:    Past Medical History:   Diagnosis Date   • Depression    • Glaucoma    • Hypertension       History  "reviewed. No pertinent surgical history.   Family History   Problem Relation Age of Onset   • Alzheimer's disease Mother    • Cancer Mother    • Dementia Mother    • Cancer Father    • Heart disease Father         Review of Systems  Objective:    /86   Pulse 70   Ht 185.4 cm (72.99\")   Wt 85.3 kg (188 lb)   SpO2 96%   BMI 24.81 kg/m²     Neurology Exam:  General apperance: NAD.     Mental status: Alert, awake and oriented to time place and person.    Recent and Remote memory: Can recall 3/3 objects at 5 minutes. Can recall historical events.     Attention span and Concentration: Serial 7s: Normal.     Fund of knowledge:  Normal.     Language and Speech: No aphasia or dysarthria.    Naming , Repitition and Comprehension:  Can name objects, repeat a sentence and follow commands. Speech is clear and fluent with good repetition, comprehension, and naming.    CN II to XII: Intact.    Opthalmoscopic Exam: No papilledema.    Motor:  Right UE muscle strength 5/5. Normal tone.     Left UE muscle strength 5/5. Normal tone.      Right LE muscle strength5/5. Normal tone.     Left LE muscle strength 5/5. Normal tone.      Sensory: Normal light touch, vibration and pinprick sensation bilaterally.    DTRs: 2+ bilaterally.    Babinski: Negative bilaterally.    Co-ordination: Normal finger-to-nose, heel to shin B/L.  Moderate intensity action and postural tremors are noted bilaterally.    Rhomberg: Negative.    Gait: Normal.    Cardiovascular: Regular rate and rhythm without murmur, gallop or rub.    Assessment and Plan:  1. Tremor  He reports that with addition of propranolol 20 mg 3 times daily to primidone 250 mg daily, there has been not much reduction in the intensity of tremors.  He denies any side effects with propranolol use and blood pressure remained stable.  I have advised him to take propranolol 40 mg in the morning, 20 mg in the afternoon and 20 mg at night and continue with primidone 250 mg 3 times daily see " how he does.  I have advised him to monitor blood pressure for 1 week and I will plan to see him back in clinic in 4 months for follow-up.    I spent 30 minutes face to face with the patient and spent more than 50% of this time  in management, instructions and education regarding above mentioned diagnosis and also on counseling and discussing about taking medication regularly, possible side effects with medication use, importance of good sleep hygiene, good hydration and regular exercise.    Return in about 4 months (around 11/20/2021).

## 2021-10-07 RX ORDER — PRIMIDONE 250 MG/1
TABLET ORAL
Qty: 270 TABLET | Refills: 1 | Status: SHIPPED | OUTPATIENT
Start: 2021-10-07 | End: 2022-07-01

## 2021-10-07 RX ORDER — PROPRANOLOL HYDROCHLORIDE 20 MG/1
TABLET ORAL
Qty: 90 TABLET | Refills: 2 | Status: SHIPPED | OUTPATIENT
Start: 2021-10-07 | End: 2022-01-11

## 2022-01-11 RX ORDER — PROPRANOLOL HYDROCHLORIDE 20 MG/1
TABLET ORAL
Qty: 270 TABLET | Refills: 2 | Status: SHIPPED | OUTPATIENT
Start: 2022-01-11 | End: 2022-02-21

## 2022-02-21 RX ORDER — PROPRANOLOL HYDROCHLORIDE 20 MG/1
TABLET ORAL
Qty: 90 TABLET | Refills: 1 | Status: SHIPPED | OUTPATIENT
Start: 2022-02-21 | End: 2022-09-29 | Stop reason: SDUPTHER

## 2022-07-01 RX ORDER — PRIMIDONE 250 MG/1
TABLET ORAL
Qty: 90 TABLET | Refills: 0 | Status: SHIPPED | OUTPATIENT
Start: 2022-07-01 | End: 2022-08-01

## 2022-07-01 NOTE — TELEPHONE ENCOUNTER
Rx Refill Note  Requested Prescriptions     Pending Prescriptions Disp Refills   • primidone (MYSOLINE) 250 MG tablet [Pharmacy Med Name: PRIMIDONE 250 MG TABLET] 270 tablet 1     Sig: TAKE ONE TABLET BY MOUTH THREE TIMES A DAY      Last filled: 10/07/2021 270 with 1 refill.  Last office visit with prescribing clinician: 7/20/2021      Next office visit with prescribing clinician: Visit date not found     Sent in 90 with 0 refills.  Patient needs follow up appointment for further refills.    Lesley Pack MA  07/01/22, 10:10 EDT

## 2022-08-01 RX ORDER — PRIMIDONE 250 MG/1
TABLET ORAL
Qty: 90 TABLET | Refills: 0 | Status: SHIPPED | OUTPATIENT
Start: 2022-08-01 | End: 2022-08-31

## 2022-08-31 RX ORDER — PRIMIDONE 250 MG/1
TABLET ORAL
Qty: 90 TABLET | Refills: 0 | Status: SHIPPED | OUTPATIENT
Start: 2022-08-31 | End: 2022-09-29 | Stop reason: SDUPTHER

## 2022-09-16 ENCOUNTER — TELEPHONE (OUTPATIENT)
Dept: NEUROLOGY | Facility: CLINIC | Age: 82
End: 2022-09-16

## 2022-09-16 NOTE — TELEPHONE ENCOUNTER
Provider: ELENA THACKER  Caller: HAN PACHECO  Relationship to Patient:PT'S DAUGHTER  Phone Number: [phone]    Reason for Call: PT'S DAUGHTER CALLED STATING THAT THE PT IS HAVING A LOT OF MEMORY ISSUES GOING ON, SHE IS GOING RIGHT NOW TO PICK THE PT UP CAUSE HE DROVE TO [place] AND DON'T KNOW WHY HE DRIVE THERE FOR CAUSE HE TOLD THE OFFICER THAT HE WAS TRYING TO GO TO [facility]. THE MEMORY ISSUES HAS BEEN GOING AT LEAST A YEAR, LIKE FORGOT HOW THE CLOCK WORKS, FORGET THE WORDS THAT HE TRIES TO SAY AND GETS REAL ANXIUOS AS WELL. PT HAS APT ON 9-29-22 AND THE DAUGHTER WANTS LIKE MAYBE A TEST THAT CAN BE DONE THAT DAY?

## 2022-09-29 ENCOUNTER — OFFICE VISIT (OUTPATIENT)
Dept: NEUROLOGY | Facility: CLINIC | Age: 82
End: 2022-09-29

## 2022-09-29 VITALS
SYSTOLIC BLOOD PRESSURE: 140 MMHG | BODY MASS INDEX: 22.93 KG/M2 | OXYGEN SATURATION: 96 % | HEART RATE: 94 BPM | WEIGHT: 173 LBS | HEIGHT: 73 IN | DIASTOLIC BLOOD PRESSURE: 98 MMHG

## 2022-09-29 DIAGNOSIS — G30.1 LATE ONSET ALZHEIMER'S DEMENTIA WITHOUT BEHAVIORAL DISTURBANCE: ICD-10-CM

## 2022-09-29 DIAGNOSIS — F02.80 LATE ONSET ALZHEIMER'S DEMENTIA WITHOUT BEHAVIORAL DISTURBANCE: ICD-10-CM

## 2022-09-29 DIAGNOSIS — R25.1 TREMOR: Primary | ICD-10-CM

## 2022-09-29 PROCEDURE — 99215 OFFICE O/P EST HI 40 MIN: CPT | Performed by: PSYCHIATRY & NEUROLOGY

## 2022-09-29 RX ORDER — PRIMIDONE 250 MG/1
250 TABLET ORAL 3 TIMES DAILY
Qty: 270 TABLET | Refills: 1 | Status: SHIPPED | OUTPATIENT
Start: 2022-09-29 | End: 2022-12-28

## 2022-09-29 RX ORDER — PROPRANOLOL HYDROCHLORIDE 20 MG/1
20 TABLET ORAL 2 TIMES DAILY
Qty: 180 TABLET | Refills: 1 | Status: SHIPPED | OUTPATIENT
Start: 2022-09-29 | End: 2022-12-28

## 2022-09-29 RX ORDER — DONEPEZIL HYDROCHLORIDE 5 MG/1
5 TABLET, FILM COATED ORAL NIGHTLY
Qty: 30 TABLET | Refills: 3 | Status: SHIPPED | OUTPATIENT
Start: 2022-09-29 | End: 2023-09-29

## 2022-09-29 NOTE — PROGRESS NOTES
Subjective:    CC: Justin Tovar is in clinic today for follow up for tremors and new concern about problems with memory and cognitive decline.    HPI:  1/24/2020: He is in clinic for regular follow-up.  Since her last visit, he reports that he has been taking primidone and the dose has been increased to 200 mg at bedtime.  He reports that he has had very good response and overall intensity of tremors have reduced.  He reports that prior to starting primidone, he could not write but now with primidone on board, he is at least able to write.  He still has tremors but they have reduced in intensity.  He denies any side effects with primidone use.    6/29/2020: He is in clinic for regular follow-up.  Since his last visit in January, he reports that he has been taking primidone 150 mg in the morning at 250 mg at night.  He did well initially for couple of months but lately he reports that the tremors in right hand as well as head tremors have become somewhat worse.  He denies any side effects with primidone use.    9/29/2020: He is in clinic for regular follow-up.  Since her last visit 3 months ago, he now is taking the primidone 250 mg in the morning and 250 mg at night.  Even after increasing the dose of primidone, he reports that the tremors has not changed much.  He continues to have difficulty with writing, difficulty when holding a cup of coffee a glass of water.  Continues to have moderate intensity head tremors as well.  He denies any side effects with primidone use.    12/1/2020: He is in clinic for regular follow-up.  Since her last visit, he is now taking primidone 250 mg in the morning, 100 mg in the afternoon and 250 mg at night.  He reports that even with this dose, there has been no change in head or hand tremors.  He is having extreme difficulty using hands well holding cup of coffee, glass of water, handwriting as well as using spoon or fork.    3/1/2021: He is in clinic for regular follow-up.  Since  his last visit in December, I did send referral to  neurosurgery for possible deep brain stimulation procedure to help with treatment resistant tremors however he decided not to have the procedure done so he did not go for consultation.  He reports that for the most part with primidone 250 mg 3 times a day dose, he can function throughout the day and can maintain ADLs.  He denies any side effects with primidone use.    Follow-up: 2021-07-20: He is in clinic for regular follow-up.  Since his last visit in March, he reports that he has been taking primidone 250 mg 3 times a day along with propranolol 20 mg 3 times daily.  With addition of propranolol, he has not noticed much difference but overall he feels that with current combination, tremors are controlled enough to do activities of daily living.  He does have trouble writing but otherwise he does okay throughout the day.  He denies any side effects with propranolol use.    Follow-up: 9/29/2022: He is in clinic for regular follow-up.  Since his last visit in July 2021, he reports that he continues to take primidone 250 mg 3 times daily and has reduced the dose of propranolol to 20 mg twice daily as 20 mg 3 times daily dose was making him too sleepy.  In addition, daughter who is accompanying him reports that she has noticed a gradual decline in his memory, having episodes of confusion.  2 weeks ago, while he was driving, he got confused and got lost and ultimately, police had to be called to bring him home back.  He is having problems remembering things, misplacing things easily.  No problems with walking or balance.  No episodes of hallucinations.  He is sleeping well through the night.  He is taking Restoril 30 mg at bedtime to help with sleep.    The following portions of the patient's history were reviewed and updated as of 09/29/2022: allergies, social history and problem list.       Current Outpatient Medications:   •  brimonidine (ALPHAGAN P) 0.1 %  "solution ophthalmic solution, Alphagan P 0.1 % eye drops  PLACE ONE DROP IN EACH EYE THREE TIMES A DAY, Disp: , Rfl:   •  buPROPion XL (WELLBUTRIN XL) 150 MG 24 hr tablet, , Disp: , Rfl:   •  dorzolamide-timolol (COSOPT) 22.3-6.8 MG/ML ophthalmic solution, , Disp: , Rfl:   •  doxazosin (CARDURA) 4 MG tablet, , Disp: , Rfl:   •  Lumigan 0.01 % ophthalmic drops, , Disp: , Rfl:   •  PARoxetine (PAXIL) 30 MG tablet, , Disp: , Rfl:   •  primidone (MYSOLINE) 250 MG tablet, Take 1 tablet by mouth 3 (Three) Times a Day for 90 days., Disp: 270 tablet, Rfl: 1  •  propranolol (INDERAL) 20 MG tablet, Take 1 tablet by mouth 2 (Two) Times a Day for 90 days., Disp: 180 tablet, Rfl: 1  •  SF 1.1 % gel, , Disp: , Rfl:   •  temazepam (RESTORIL) 30 MG capsule, , Disp: , Rfl:   •  donepezil (Aricept) 5 MG tablet, Take 1 tablet by mouth Every Night., Disp: 30 tablet, Rfl: 3   Past Medical History:   Diagnosis Date   • Depression    • Glaucoma    • Hypertension       History reviewed. No pertinent surgical history.   Family History   Problem Relation Age of Onset   • Alzheimer's disease Mother    • Cancer Mother    • Dementia Mother    • Cancer Father    • Heart disease Father         Review of Systems   Neurological: Positive for memory problem.     Objective:    /98   Pulse 94   Ht 185.4 cm (73\")   Wt 78.5 kg (173 lb)   SpO2 96%   BMI 22.82 kg/m²     Neurology Exam:  General apperance: NAD.     Mental status: Alert, awake and oriented to time place and person.    Recent and Remote memory: Can recall 3/3 objects at 5 minutes. Can recall historical events.     Attention span and Concentration: Serial 7s: Normal.     Fund of knowledge:  Normal.     Language and Speech: No aphasia or dysarthria.    Naming , Repitition and Comprehension:  Can name objects, repeat a sentence and follow commands. Speech is clear and fluent with good repetition, comprehension, and naming.    CN II to XII: Intact.    Opthalmoscopic Exam: No " papilledema.    Motor:  Right UE muscle strength 5/5. Normal tone.     Left UE muscle strength 5/5. Normal tone.      Right LE muscle strength5/5. Normal tone.     Left LE muscle strength 5/5. Normal tone.      Sensory: Normal light touch, vibration and pinprick sensation bilaterally.    DTRs: 2+ bilaterally.    Babinski: Negative bilaterally.    Co-ordination: Normal finger-to-nose, heel to shin B/L.  Moderate intensity head tremors, moderate intensity postural and action tremors noted.  Very mild right hand resting tremors noted as well.    Rhomberg: Negative.    Gait: Normal.  Walks slowly but normal arm swing and normal walking stride.    Cardiovascular: Regular rate and rhythm without murmur, gallop or rub.    MMSE: 17 out of 30.    Assessment and Plan:  1. Tremor  2. Late onset Alzheimer's dementia without behavioral disturbance (HCC)  Tremors remain unchanged.  He continues to take primidone 250 mg 3 times daily and propranolol 20 mg twice daily.  This will be continued.  The most important concern since his last visit is gradual cognitive decline, episodes of confusion etc.  Today in clinic, he scored 17 out of 30 on MMSE representing mild to early moderate dementia.  I am going to start him on Aricept 5 mg at bedtime and see how he does.  If no side effects and then plan is to increase it to 10 mg in 3 months on the next visit.  I have advised him that he cannot drive.  I will plan to see him back in clinic in 3 months for follow-up.       I spent 45  minutes in patient care: Reviewing records prior to the visit, entering orders and documentation and spent more than whitt 50% of this time face-to-face in management, instructions and education regarding above mentioned diagnosis and also on counseling and discussing about taking medication regularly, possible side effects with medication use, importance of good sleep hygiene, good hydration and regular exercise.    Return in about 3 months (around 12/29/2022).

## 2023-06-15 RX ORDER — PROPRANOLOL HYDROCHLORIDE 20 MG/1
TABLET ORAL
Qty: 270 TABLET | Refills: 1 | Status: SHIPPED | OUTPATIENT
Start: 2023-06-15 | End: 2023-09-13

## 2023-06-15 NOTE — TELEPHONE ENCOUNTER
Rx Refill Note  Requested Prescriptions     Pending Prescriptions Disp Refills   • propranolol (INDERAL) 20 MG tablet [Pharmacy Med Name: PROPRANOLOL 20 MG TABLET] 270 tablet      Sig: TAKE ONE TABLET BY MOUTH THREE TIMES A DAY      Last filled: 09/29/2022, 180 with 1 Refill.   Last office visit with prescribing clinician: 9/29/2022      Next office visit with prescribing clinician: Visit date not found     Leonela Patel MA  06/15/23, 14:13 EDT

## 2023-08-14 RX ORDER — DONEPEZIL HYDROCHLORIDE 5 MG/1
TABLET, FILM COATED ORAL
Qty: 90 TABLET | Refills: 1 | Status: SHIPPED | OUTPATIENT
Start: 2023-08-14

## 2023-12-08 RX ORDER — PRIMIDONE 250 MG/1
250 TABLET ORAL 3 TIMES DAILY
Qty: 270 TABLET | Refills: 1 | OUTPATIENT
Start: 2023-12-08

## 2023-12-08 NOTE — TELEPHONE ENCOUNTER
Rx Refill Note  Requested Prescriptions     Pending Prescriptions Disp Refills    primidone (MYSOLINE) 250 MG tablet [Pharmacy Med Name: PRIMIDONE 250 MG TABLET] 270 tablet 1     Sig: TAKE ONE TABLET BY MOUTH THREE TIMES A DAY      Last filled: 9/29/22 #90 with  Last office visit with prescribing clinician: 9/29/2022      Next office visit with prescribing clinician: 1/16/2024     Ernesto Steele MA  12/08/23, 09:19 EST

## 2023-12-08 NOTE — TELEPHONE ENCOUNTER
Outgoing call to Brooke on primidone. Pt is currently taking, daughter thinks twice daily (is unsure of strength though). I confirmed as we have not sent in an rx since Sept 2022.    She stated Dec 2022 he moved to assisted living facility and we think another Dr may have taken over the rx. I advised they contact that prescriber if they need a refill as we see pt in a little over a month, and we can see about taking the rx back over then. She said he likely has enough to get to his appt with us, and that she didn't put in the refill request.         Ernesto RODRIGUEZ

## 2024-01-16 ENCOUNTER — OFFICE VISIT (OUTPATIENT)
Dept: NEUROLOGY | Facility: CLINIC | Age: 84
End: 2024-01-16
Payer: MEDICARE

## 2024-01-16 VITALS
WEIGHT: 180 LBS | HEART RATE: 65 BPM | OXYGEN SATURATION: 97 % | SYSTOLIC BLOOD PRESSURE: 118 MMHG | HEIGHT: 73 IN | BODY MASS INDEX: 23.86 KG/M2 | DIASTOLIC BLOOD PRESSURE: 78 MMHG

## 2024-01-16 DIAGNOSIS — R25.1 TREMOR: Primary | ICD-10-CM

## 2024-01-16 DIAGNOSIS — G30.1 LATE ONSET ALZHEIMER'S DEMENTIA WITHOUT BEHAVIORAL DISTURBANCE: ICD-10-CM

## 2024-01-16 DIAGNOSIS — F02.80 LATE ONSET ALZHEIMER'S DEMENTIA WITHOUT BEHAVIORAL DISTURBANCE: ICD-10-CM

## 2024-01-16 PROCEDURE — 99214 OFFICE O/P EST MOD 30 MIN: CPT | Performed by: PSYCHIATRY & NEUROLOGY

## 2024-01-16 RX ORDER — PRIMIDONE 250 MG/1
250 TABLET ORAL 2 TIMES DAILY
Qty: 180 TABLET | Refills: 1 | Status: SHIPPED | OUTPATIENT
Start: 2024-01-16 | End: 2024-04-15

## 2024-01-16 RX ORDER — PRIMIDONE 50 MG/1
100 TABLET ORAL NIGHTLY
Qty: 180 TABLET | Refills: 1 | Status: SHIPPED | OUTPATIENT
Start: 2024-01-16 | End: 2024-04-15

## 2024-01-16 RX ORDER — PROPRANOLOL HYDROCHLORIDE 20 MG/1
20 TABLET ORAL 2 TIMES DAILY
Qty: 180 TABLET | Refills: 1 | Status: SHIPPED | OUTPATIENT
Start: 2024-01-16 | End: 2024-04-15

## 2024-01-16 RX ORDER — DONEPEZIL HYDROCHLORIDE 10 MG/1
10 TABLET, FILM COATED ORAL NIGHTLY
Qty: 90 TABLET | Refills: 1 | Status: SHIPPED | OUTPATIENT
Start: 2024-01-16 | End: 2024-04-15

## 2024-01-16 NOTE — PROGRESS NOTES
Subjective:    CC: Justin Tovar is in clinic today for follow up for history of tremors and memory impairment.    HPI:  1/24/2020: He is in clinic for regular follow-up.  Since her last visit, he reports that he has been taking primidone and the dose has been increased to 200 mg at bedtime.  He reports that he has had very good response and overall intensity of tremors have reduced.  He reports that prior to starting primidone, he could not write but now with primidone on board, he is at least able to write.  He still has tremors but they have reduced in intensity.  He denies any side effects with primidone use.    6/29/2020: He is in clinic for regular follow-up.  Since his last visit in January, he reports that he has been taking primidone 150 mg in the morning at 250 mg at night.  He did well initially for couple of months but lately he reports that the tremors in right hand as well as head tremors have become somewhat worse.  He denies any side effects with primidone use.    9/29/2020: He is in clinic for regular follow-up.  Since her last visit 3 months ago, he now is taking the primidone 250 mg in the morning and 250 mg at night.  Even after increasing the dose of primidone, he reports that the tremors has not changed much.  He continues to have difficulty with writing, difficulty when holding a cup of coffee a glass of water.  Continues to have moderate intensity head tremors as well.  He denies any side effects with primidone use.    12/1/2020: He is in clinic for regular follow-up.  Since her last visit, he is now taking primidone 250 mg in the morning, 100 mg in the afternoon and 250 mg at night.  He reports that even with this dose, there has been no change in head or hand tremors.  He is having extreme difficulty using hands well holding cup of coffee, glass of water, handwriting as well as using spoon or fork.    3/1/2021: He is in clinic for regular follow-up.  Since his last visit in December, I  did send referral to [facility] for possible deep brain stimulation procedure to help with treatment resistant tremors however he decided not to have the procedure done so he did not go for consultation.  He reports that for the most part with primidone 250 mg 3 times a day dose, he can function throughout the day and can maintain ADLs.  He denies any side effects with primidone use.    Follow-up: 2021-07-20: He is in clinic for regular follow-up.  Since his last visit in March, he reports that he has been taking primidone 250 mg 3 times a day along with propranolol 20 mg 3 times daily.  With addition of propranolol, he has not noticed much difference but overall he feels that with current combination, tremors are controlled enough to do activities of daily living.  He does have trouble writing but otherwise he does okay throughout the day.  He denies any side effects with propranolol use.    Follow-up: 9/29/2022: He is in clinic for regular follow-up.  Since his last visit in July 2021, he reports that he continues to take primidone 250 mg 3 times daily and has reduced the dose of propranolol to 20 mg twice daily as 20 mg 3 times daily dose was making him too sleepy.  In addition, daughter who is accompanying him reports that she has noticed a gradual decline in his memory, having episodes of confusion.  2 weeks ago, while he was driving, he got confused and got lost and ultimately, police had to be called to bring him home back.  He is having problems remembering things, misplacing things easily.  No problems with walking or balance.  No episodes of hallucinations.  He is sleeping well through the night.  He is taking Restoril 30 mg at bedtime to help with sleep.    Follow-up: 1/16/2024: He is in clinic for regular follow-up.  He is accompanied by his daughter who helps with the history.  Since his last visit in September 2022, he reports that overall tremors and memory have remained stable.  He is however  not taking the afternoon dose of primidone.  He takes 250 mg in the morning 250 mg at night.  He is also taking propranolol 20 mg twice daily.  He does report that some days, tremors are worse and it affects his ability to use spoon or fork.  He denies any side effects with this combination.  Memory has remained stable with use of Aricept 5 mg daily.  He denies any side effects with Aricept use.      The following portions of the patient's history were reviewed and updated as of 01/16/2024: allergies, social history, and problem list.       Current Outpatient Medications:     brimonidine (ALPHAGAN P) 0.1 % solution ophthalmic solution, Alphagan P 0.1 % eye drops  PLACE ONE DROP IN EACH EYE THREE TIMES A DAY, Disp: , Rfl:     donepezil (ARICEPT) 10 MG tablet, Take 1 tablet by mouth Every Night for 90 days., Disp: 90 tablet, Rfl: 1    dorzolamide-timolol (COSOPT) 22.3-6.8 MG/ML ophthalmic solution, , Disp: , Rfl:     doxazosin (CARDURA) 4 MG tablet, Take 1 tablet by mouth Daily., Disp: , Rfl:     Lumigan 0.01 % ophthalmic drops, , Disp: , Rfl:     PARoxetine (PAXIL) 30 MG tablet, Take 1 tablet by mouth 2 (Two) Times a Day., Disp: , Rfl:     primidone (MYSOLINE) 250 MG tablet, Take 1 tablet by mouth 2 (Two) Times a Day for 90 days., Disp: 180 tablet, Rfl: 1    propranolol (INDERAL) 20 MG tablet, Take 1 tablet by mouth 2 (Two) Times a Day for 90 days., Disp: 180 tablet, Rfl: 1    SF 1.1 % gel, , Disp: , Rfl:     temazepam (RESTORIL) 30 MG capsule, Take 1 capsule by mouth Daily., Disp: , Rfl:     primidone (MYSOLINE) 50 MG tablet, Take 2 tablets by mouth Every Night for 90 days., Disp: 180 tablet, Rfl: 1   Past Medical History:   Diagnosis Date    Depression     Glaucoma     Hypertension       No past surgical history on file.   Family History   Problem Relation Age of Onset    Alzheimer's disease Mother     Cancer Mother     Dementia Mother     Cancer Father     Heart disease Father         Review of  "Systems  Objective:    /78   Pulse 65   Ht 185.4 cm (73\") Comment: self reported  Wt 81.6 kg (180 lb) Comment: self reported  SpO2 97%   BMI 23.75 kg/m²     Neurology Exam:  General apperance: NAD.     Mental status: Alert, awake and oriented to time place and person.    Language and Speech: No aphasia or dysarthria.    CN II to XII: Intact.    Opthalmoscopic Exam: No papilledema.    Motor:  Right UE muscle strength 5/5. Normal tone.     Left UE muscle strength 5/5. Normal tone.      Right LE muscle strength 5/5. Normal tone.     Left LE muscle strength 5/5. Normal tone.      Sensory: Normal light touch, vibration and pinprick sensation bilaterally.    DTRs: 2+ bilaterally.    Babinski: Negative bilaterally.    Co-ordination: Normal finger-to-nose, heel to shin B/L.    Mild to moderate intensity action and postural tremors noted bilaterally.  Mild head tremors noted as well.    Rhomberg: Negative.    Gait: Normal.    Cardiovascular: Regular rate and rhythm without murmur, gallop or rub.    Assessment and Plan:  1. Tremor  2. Late onset Alzheimer's dementia without behavioral disturbance  -For the most part, tremors are stable however he does experience worsening in tremor some days and is difficult for him to use spoon or fork those days.  He was taking primidone 250 mg 3 times daily but currently is unable to take afternoon dose.  I will be increasing the nighttime dose to 350 mg and will continue 250 mg in the morning and see how he does.  Continue with propranolol 20 mg twice daily.  Weight has remained stable since he does not have any side effects with Aricept use, I will be increasing it to 10 mg daily for better therapeutic effect.  On last visit, he had scored 19/30 on MMSE.  Otherwise, I will see him back in clinic in 6 months for follow-up.       I spent 30 minutes in patient care: Reviewing records prior to the visit, entering orders and documentation and spent more than whitt 50% of this time " face-to-face in management, instructions and education regarding above mentioned diagnosis and also on counseling and discussing about taking medication regularly, possible side effects with medication use, importance of good sleep hygiene, good hydration and regular exercise.    Return in about 6 months (around 7/16/2024).       Note to patient: The 21st Century Cures Act makes medical notes like these available to patients in the interest of transparency. However, be advised this is a medical document. It is intended as peer to peer communication. It is written in medical language and may contain abbreviations or verbiage that are unfamiliar. It may appear blunt or direct. Medical documents are intended to carry relevant information, facts as evident, and the clinical opinion of the physician.

## 2024-03-12 NOTE — TELEPHONE ENCOUNTER
Caller: Han Tovar    Relationship: Emergency Contact    Best call back number: [phone]    Requested Prescriptions:   Requested Prescriptions     Pending Prescriptions Disp Refills    donepezil (ARICEPT) 5 MG tablet [Pharmacy Med Name: DONEPEZIL HCL 5 MG TABLET] 30 tablet 3     Sig: TAKE ONE TABLET BY MOUTH ONCE NIGHTLY        Pharmacy where request should be sent: [facility] [id number] [address] & [facility] [phone] [facility] [phone] FX     Last office visit with prescribing clinician: 9/29/2022   Last telemedicine visit with prescribing clinician: Visit date not found   Next office visit with prescribing clinician: 1/16/2024     Additional details provided by patient: PT HAS BEEN TAKING THIS RX PER HIS DAUGHTER HAN. HE JUST RAN OUT LAST NIGHT. [facility] WAS IN [facility] AND SHE THOUGHT THAT MAYBE HE WAS GETTING THE RX THROUGH [facility] BUT PT IS NOW BACK AT HIS HOME.    PT HAS BEEN SCHEDULE FOR 1ST AVAILABLE AND IS ON THE WAIT LIST FOR SOONER APPT.    Does the patient have less than a 3 day supply:  [x] Yes  [] No    Would you like a call back once the refill request has been completed: [] Yes [] No    If the office needs to give you a call back, can they leave a voicemail: [] Yes [] No    Faith Marie Rep   08/14/23 12:28 EDT   
Received refill request for pt's aricept. Pt is overdue/would have been out for some time. Pt was also last seen in Sept last yr-- office notes say Dr. Squires wanted to see him back 3 mos later.    Called to see if pt is still taking med/if he needs it & to try and schedule appt. Jani 1st available is not until Jan 16th. Offered that date w/ times and offered to put pt on waitlist as well.   
Rx Refill Note  Requested Prescriptions     Pending Prescriptions Disp Refills    donepezil (ARICEPT) 5 MG tablet [Pharmacy Med Name: DONEPEZIL HCL 5 MG TABLET] 30 tablet 3     Sig: TAKE ONE TABLET BY MOUTH ONCE NIGHTLY      Last filled:   Last office visit with prescribing clinician: 9/29/2022      Next office visit with prescribing clinician: Visit date not found     Ernesto Steele MA  08/14/23, 10:50 EDT  
5

## 2024-07-16 ENCOUNTER — OFFICE VISIT (OUTPATIENT)
Dept: NEUROLOGY | Facility: CLINIC | Age: 84
End: 2024-07-16
Payer: MEDICARE

## 2024-07-16 ENCOUNTER — TELEPHONE (OUTPATIENT)
Dept: NEUROLOGY | Facility: CLINIC | Age: 84
End: 2024-07-16

## 2024-07-16 VITALS
SYSTOLIC BLOOD PRESSURE: 118 MMHG | DIASTOLIC BLOOD PRESSURE: 70 MMHG | BODY MASS INDEX: 23.86 KG/M2 | OXYGEN SATURATION: 95 % | HEART RATE: 68 BPM | WEIGHT: 180 LBS | HEIGHT: 73 IN

## 2024-07-16 DIAGNOSIS — G30.1 LATE ONSET ALZHEIMER'S DEMENTIA WITHOUT BEHAVIORAL DISTURBANCE: ICD-10-CM

## 2024-07-16 DIAGNOSIS — F02.80 LATE ONSET ALZHEIMER'S DEMENTIA WITHOUT BEHAVIORAL DISTURBANCE: ICD-10-CM

## 2024-07-16 DIAGNOSIS — R25.1 TREMOR: Primary | ICD-10-CM

## 2024-07-16 PROCEDURE — 1159F MED LIST DOCD IN RCRD: CPT | Performed by: PSYCHIATRY & NEUROLOGY

## 2024-07-16 PROCEDURE — 1160F RVW MEDS BY RX/DR IN RCRD: CPT | Performed by: PSYCHIATRY & NEUROLOGY

## 2024-07-16 PROCEDURE — 99214 OFFICE O/P EST MOD 30 MIN: CPT | Performed by: PSYCHIATRY & NEUROLOGY

## 2024-07-16 RX ORDER — PRIMIDONE 50 MG/1
TABLET ORAL
Qty: 180 TABLET | Refills: 1 | OUTPATIENT
Start: 2024-07-16

## 2024-07-16 RX ORDER — DONEPEZIL HYDROCHLORIDE 10 MG/1
10 TABLET, FILM COATED ORAL NIGHTLY
Qty: 90 TABLET | Refills: 1 | OUTPATIENT
Start: 2024-07-16

## 2024-07-16 RX ORDER — PRIMIDONE 250 MG/1
250 TABLET ORAL 2 TIMES DAILY
Qty: 180 TABLET | Refills: 1 | OUTPATIENT
Start: 2024-07-16

## 2024-07-16 NOTE — TELEPHONE ENCOUNTER
While in clinic today,  printed off [facility] parking placard and signed for Justin to have a handicapped parking placard for 10 years. Handed to daughter, Brooke at check out.

## 2024-07-16 NOTE — PROGRESS NOTES
Subjective:    CC: Justin Tovar is in clinic today for follow up for tremors and memory impairment.    HPI:  1/24/2020: He is in clinic for regular follow-up.  Since her last visit, he reports that he has been taking primidone and the dose has been increased to 200 mg at bedtime.  He reports that he has had very good response and overall intensity of tremors have reduced.  He reports that prior to starting primidone, he could not write but now with primidone on board, he is at least able to write.  He still has tremors but they have reduced in intensity.  He denies any side effects with primidone use.    6/29/2020: He is in clinic for regular follow-up.  Since his last visit in January, he reports that he has been taking primidone 150 mg in the morning at 250 mg at night.  He did well initially for couple of months but lately he reports that the tremors in right hand as well as head tremors have become somewhat worse.  He denies any side effects with primidone use.    9/29/2020: He is in clinic for regular follow-up.  Since her last visit 3 months ago, he now is taking the primidone 250 mg in the morning and 250 mg at night.  Even after increasing the dose of primidone, he reports that the tremors has not changed much.  He continues to have difficulty with writing, difficulty when holding a cup of coffee a glass of water.  Continues to have moderate intensity head tremors as well.  He denies any side effects with primidone use.    12/1/2020: He is in clinic for regular follow-up.  Since her last visit, he is now taking primidone 250 mg in the morning, 100 mg in the afternoon and 250 mg at night.  He reports that even with this dose, there has been no change in head or hand tremors.  He is having extreme difficulty using hands well holding cup of coffee, glass of water, handwriting as well as using spoon or fork.    3/1/2021: He is in clinic for regular follow-up.  Since his last visit in December, I did send  referral to [facility] for possible deep brain stimulation procedure to help with treatment resistant tremors however he decided not to have the procedure done so he did not go for consultation.  He reports that for the most part with primidone 250 mg 3 times a day dose, he can function throughout the day and can maintain ADLs.  He denies any side effects with primidone use.    Follow-up: 2021-07-20: He is in clinic for regular follow-up.  Since his last visit in March, he reports that he has been taking primidone 250 mg 3 times a day along with propranolol 20 mg 3 times daily.  With addition of propranolol, he has not noticed much difference but overall he feels that with current combination, tremors are controlled enough to do activities of daily living.  He does have trouble writing but otherwise he does okay throughout the day.  He denies any side effects with propranolol use.    Follow-up: 9/29/2022: He is in clinic for regular follow-up.  Since his last visit in July 2021, he reports that he continues to take primidone 250 mg 3 times daily and has reduced the dose of propranolol to 20 mg twice daily as 20 mg 3 times daily dose was making him too sleepy.  In addition, daughter who is accompanying him reports that she has noticed a gradual decline in his memory, having episodes of confusion.  2 weeks ago, while he was driving, he got confused and got lost and ultimately, police had to be called to bring him home back.  He is having problems remembering things, misplacing things easily.  No problems with walking or balance.  No episodes of hallucinations.  He is sleeping well through the night.  He is taking Restoril 30 mg at bedtime to help with sleep.    Follow-up: 1/16/2024: He is in clinic for regular follow-up.  He is accompanied by his daughter who helps with the history.  Since his last visit in September 2022, he reports that overall tremors and memory have remained stable.  He is however not taking  the afternoon dose of primidone.  He takes 250 mg in the morning 250 mg at night.  He is also taking propranolol 20 mg twice daily.  He does report that some days, tremors are worse and it affects his ability to use spoon or fork.  He denies any side effects with this combination.  Memory has remained stable with use of Aricept 5 mg daily.  He denies any side effects with Aricept use.    Follow-up: 7/16/2024: In clinic for regular follow-up.  He is accompanied by his daughter who helps with the history.  Since his last visit 6 months ago, overall memory is stable.  He continues to have tremors but they are not any worse than what it was.  He is unable to take 50 mg tablet of primidone as it is too small for him to grasp in his hands.  He is currently taking primidone to 50 mg twice daily and propranolol 20 mg twice daily.  He does have visual hallucination and sees some people entering the house but they are not frightening.  He is taking Aricept to 10 mg daily.    The following portions of the patient's history were reviewed and updated as of 07/16/2024: allergies, social history, and problem list.       Current Outpatient Medications:     brimonidine (ALPHAGAN P) 0.1 % solution ophthalmic solution, Alphagan P 0.1 % eye drops  PLACE ONE DROP IN EACH EYE THREE TIMES A DAY, Disp: , Rfl:     dorzolamide-timolol (COSOPT) 22.3-6.8 MG/ML ophthalmic solution, , Disp: , Rfl:     doxazosin (CARDURA) 4 MG tablet, Take 1 tablet by mouth Daily., Disp: , Rfl:     PARoxetine (PAXIL) 30 MG tablet, Take 1 tablet by mouth 2 (Two) Times a Day., Disp: , Rfl:     temazepam (RESTORIL) 30 MG capsule, Take 1 capsule by mouth Daily., Disp: , Rfl:     donepezil (ARICEPT) 10 MG tablet, Take 1 tablet by mouth Every Night for 90 days., Disp: 90 tablet, Rfl: 1    Lumigan 0.01 % ophthalmic drops, , Disp: , Rfl:     primidone (MYSOLINE) 250 MG tablet, Take 1 tablet by mouth 2 (Two) Times a Day for 90 days., Disp: 180 tablet, Rfl: 1    primidone  "(MYSOLINE) 50 MG tablet, Take 2 tablets by mouth Every Night for 90 days., Disp: 180 tablet, Rfl: 1    propranolol (INDERAL) 20 MG tablet, Take 1 tablet by mouth 2 (Two) Times a Day for 90 days., Disp: 180 tablet, Rfl: 1    SF 1.1 % gel, , Disp: , Rfl:    Past Medical History:   Diagnosis Date    Depression     Glaucoma     Hypertension       History reviewed. No pertinent surgical history.   Family History   Problem Relation Age of Onset    Alzheimer's disease Mother     Cancer Mother     Dementia Mother     Cancer Father     Heart disease Father         Review of Systems  Objective:    /70   Pulse 68   Ht 185.4 cm (73\")   Wt 81.6 kg (180 lb)   SpO2 95%   BMI 23.75 kg/m²     Neurology Exam:  General apperance: NAD.     Mental status: Alert, awake and oriented to time place and person.    Language and Speech: No aphasia or dysarthria.    CN II to XII: Intact.    Opthalmoscopic Exam: No papilledema.    Motor:  Right UE muscle strength 5/5. Normal tone.     Left UE muscle strength 5/5. Normal tone.      Right LE muscle strength 5/5. Normal tone.     Left LE muscle strength 5/5. Normal tone.      Sensory: Normal light touch, vibration and pinprick sensation bilaterally.    DTRs: 2+ bilaterally.    Babinski: Negative bilaterally.    Co-ordination: Normal finger-to-nose, heel to shin B/L.    Resting and action tremors bilaterally.    Rhomberg: Negative.    Gait: Normal.    Cardiovascular: Regular rate and rhythm without murmur, gallop or rub.    Assessment and Plan:  1. Tremor  2. Late onset Alzheimer's dementia without behavioral disturbance  Overall memory has remained stable.  Continue with Aricept 10 mg daily.  He is unable to take extra 100 mg of primidone at night so I have advised him to take primidone to 50 mg in the morning and 375 mg (1.5 tablets of 250 mg) at night and see if it helps reduce frequency and intensity of tremors further.  He does report some visual hallucinations but they are not " frightening so that is reassuring.  Otherwise, I will see him back in clinic in 6 months for follow-up and we will plan to repeat MMSE at that time.       I spent 30 minutes in patient care: Reviewing records prior to the visit, entering orders and documentation and spent more than whitt 50% of this time face-to-face in management, instructions and education regarding above mentioned diagnosis and also on counseling and discussing about taking medication regularly, possible side effects with medication use, importance of good sleep hygiene, good hydration and regular exercise.    No follow-ups on file.       Note to patient: The 21st Century Cures Act makes medical notes like these available to patients in the interest of transparency. However, be advised this is a medical document. It is intended as peer to peer communication. It is written in medical language and may contain abbreviations or verbiage that are unfamiliar. It may appear blunt or direct. Medical documents are intended to carry relevant information, facts as evident, and the clinical opinion of the physician.

## 2024-07-29 RX ORDER — DONEPEZIL HYDROCHLORIDE 10 MG/1
10 TABLET, FILM COATED ORAL NIGHTLY
Qty: 90 TABLET | Refills: 1 | Status: SHIPPED | OUTPATIENT
Start: 2024-07-29

## 2024-07-29 RX ORDER — PROPRANOLOL HYDROCHLORIDE 20 MG/1
20 TABLET ORAL 2 TIMES DAILY
Qty: 180 TABLET | Refills: 1 | Status: SHIPPED | OUTPATIENT
Start: 2024-07-29

## 2024-07-29 RX ORDER — PRIMIDONE 250 MG/1
TABLET ORAL
Qty: 225 TABLET | Refills: 1 | Status: SHIPPED | OUTPATIENT
Start: 2024-07-29

## 2024-07-29 NOTE — TELEPHONE ENCOUNTER
Per appt 7/16/24 primidone dose adjusted to 250mg in the morning and 375mg at night-- pt was unable to take the extra 50mg tabs for his evening dose as the tablets were too small to . Continue Aricept 10mg daily. No change to propranolol 20mg twice daily.     All 3 meds last filled 1/16/24 for 6 months total, each. Sending another 6 months of all 3 meds and removed the 50mg tabs from his chart with note of dose adjustment.     JENNIFER Orozco

## 2024-12-02 NOTE — TELEPHONE ENCOUNTER
Need to call pt and confirm dosing of primidone. Not sure how he has been taking, he had to adjust after his appt this summer, this rx request is not for the most updated dose he reported he was on.

## 2024-12-02 NOTE — TELEPHONE ENCOUNTER
Rx Refill Note  Requested Prescriptions     Pending Prescriptions Disp Refills    donepezil (ARICEPT) 10 MG tablet [Pharmacy Med Name: DONEPEZIL HCL 10 MG TABLET] 90 tablet 1     Sig: TAKE ONE TABLET BY MOUTH ONCE NIGHTLY    primidone (MYSOLINE) 250 MG tablet [Pharmacy Med Name: PRIMIDONE 250 MG TABLET] 225 tablet 1     Sig: TAKE 1 TABLET BY MOUTH EVERY MORNING AND TAKE 1 AND 1/2 TABLET BY MOUTH ONCE NIGHTLY      Last filled:  Last office visit with prescribing clinician: 7/16/2024      Next office visit with prescribing clinician: 1/20/2025     Ernesto Steele MA  12/02/24, 16:22 EST

## 2024-12-04 NOTE — TELEPHONE ENCOUNTER
Ignore previous note. Dose is updated. I read incorrectly at first.       Rx Refill Note  Requested Prescriptions     Pending Prescriptions Disp Refills    donepezil (ARICEPT) 10 MG tablet [Pharmacy Med Name: DONEPEZIL HCL 10 MG TABLET] 90 tablet 1     Sig: TAKE ONE TABLET BY MOUTH ONCE NIGHTLY    primidone (MYSOLINE) 250 MG tablet [Pharmacy Med Name: PRIMIDONE 250 MG TABLET] 225 tablet 1     Sig: TAKE 1 TABLET BY MOUTH EVERY MORNING AND TAKE 1 AND 1/2 TABLET BY MOUTH ONCE NIGHTLY      Last filled: 7/29/24 for 6 mos total. Pending until closer to due date.   Last office visit with prescribing clinician: 7/16/2024      Next office visit with prescribing clinician: 1/20/2025     Ernesto Steele MA  12/04/24, 15:30 EST

## 2024-12-16 RX ORDER — PRIMIDONE 250 MG/1
TABLET ORAL
Qty: 225 TABLET | Refills: 0 | Status: SHIPPED | OUTPATIENT
Start: 2024-12-16

## 2024-12-16 RX ORDER — DONEPEZIL HYDROCHLORIDE 10 MG/1
10 TABLET, FILM COATED ORAL NIGHTLY
Qty: 90 TABLET | Refills: 0 | Status: SHIPPED | OUTPATIENT
Start: 2024-12-16

## 2025-01-27 RX ORDER — DONEPEZIL HYDROCHLORIDE 10 MG/1
10 TABLET, FILM COATED ORAL NIGHTLY
Qty: 90 TABLET | Refills: 0 | OUTPATIENT
Start: 2025-01-27

## 2025-01-27 NOTE — TELEPHONE ENCOUNTER
Rx Refill Note  Requested Prescriptions     Pending Prescriptions Disp Refills    donepezil (ARICEPT) 10 MG tablet [Pharmacy Med Name: DONEPEZIL HCL 10 MG TABLET] 90 tablet 0     Sig: TAKE ONE TABLET BY MOUTH ONCE NIGHTLY      Last filled:12/16/24 90ds 0 refill  Last office visit with prescribing clinician: 7/16/2024      Next office visit with prescribing clinician: Visit date not found     CRUZ FLORES  01/27/25, 08:56 EST      Requested too soon-90ds sent 12/16/24

## 2025-01-30 RX ORDER — PRIMIDONE 250 MG/1
TABLET ORAL
Qty: 225 TABLET | Refills: 0 | OUTPATIENT
Start: 2025-01-30

## 2025-01-30 NOTE — TELEPHONE ENCOUNTER
Primidone and Aricept both sent 12/16/24 for 90 days 0 refills.     No appt scheduled, needs to schedule, cancelled Max appt due to weather

## 2025-04-03 NOTE — TELEPHONE ENCOUNTER
Rx Refill Note  Requested Prescriptions     Pending Prescriptions Disp Refills    donepezil (ARICEPT) 10 MG tablet [Pharmacy Med Name: DONEPEZIL HCL 10 MG TABLET] 90 tablet 0     Sig: TAKE ONE TABLET BY MOUTH ONCE NIGHTLY      Last filled: 12/16/2024 90 days, 0 refills Sent In   Last office visit with prescribing clinician: 7/16/2024      Next office visit with prescribing clinician: Visit date not found     Leonor Angel RN  04/03/25, 08:36 EDT

## 2025-04-04 RX ORDER — DONEPEZIL HYDROCHLORIDE 10 MG/1
10 TABLET, FILM COATED ORAL NIGHTLY
Qty: 30 TABLET | Refills: 0 | Status: SHIPPED | OUTPATIENT
Start: 2025-04-04

## 2025-05-05 RX ORDER — PRIMIDONE 250 MG/1
TABLET ORAL
Qty: 225 TABLET | Refills: 0 | OUTPATIENT
Start: 2025-05-05

## 2025-05-05 RX ORDER — DONEPEZIL HYDROCHLORIDE 10 MG/1
TABLET, FILM COATED ORAL
Qty: 30 TABLET | Refills: 0 | OUTPATIENT
Start: 2025-05-05

## 2025-05-12 RX ORDER — PROPRANOLOL HCL 20 MG
20 TABLET ORAL 2 TIMES DAILY
Qty: 180 TABLET | Refills: 1 | OUTPATIENT
Start: 2025-05-12

## 2025-05-12 NOTE — TELEPHONE ENCOUNTER
Was advised appt needed on last refill       Has yet to schedule, 2nd denial. Sent reminder message to pt.

## 2025-05-20 NOTE — TELEPHONE ENCOUNTER
Caller: Brooke Tovar    Relationship: Emergency Contact    Best call back number: [phone]    Requested Prescriptions:   Requested Prescriptions     Pending Prescriptions Disp Refills    propranolol (INDERAL) 20 MG tablet 180 tablet 1     Sig: Take 1 tablet by mouth 2 (Two) Times a Day.        Pharmacy where request should be sent: [facility] [id number] [place] [id number] & [facility] [phone] [facility] [phone] FX     Last office visit with prescribing clinician: 7/16/2024   Last telemedicine visit with prescribing clinician: Visit date not found   Next office visit with prescribing clinician: 11/3/2025     Additional details provided by patient: PATIENT IS COMPLETELY OUT OF THIS MEDICATION AND HAS SCHEDULED NEXT AVAILABLE APPT.    Does the patient have less than a 3 day supply:  [x] Yes  [] No    Would you like a call back once the refill request has been completed: [] Yes [x] No    If the office needs to give you a call back, can they leave a voicemail: [] Yes [x] No    Faith Allison Rep   05/20/25 16:24 EDT

## 2025-05-23 RX ORDER — PROPRANOLOL HCL 20 MG
20 TABLET ORAL 2 TIMES DAILY
Qty: 180 TABLET | Refills: 1 | Status: SHIPPED | OUTPATIENT
Start: 2025-05-23

## 2025-05-23 NOTE — TELEPHONE ENCOUNTER
Rx Refill Note  Requested Prescriptions     Pending Prescriptions Disp Refills    propranolol (INDERAL) 20 MG tablet 180 tablet 1     Sig: Take 1 tablet by mouth 2 (Two) Times a Day.      Last filled: 7/29/24, 180 with 1 refill  Last office visit with prescribing clinician: 7/16/2024      Next office visit with prescribing clinician: 11/3/2025     Leonela Patel MA  05/23/25, 08:56 EDT

## 2025-06-30 RX ORDER — DONEPEZIL HYDROCHLORIDE 10 MG/1
10 TABLET, FILM COATED ORAL NIGHTLY
Qty: 90 TABLET | Refills: 1 | Status: SHIPPED | OUTPATIENT
Start: 2025-06-30

## 2025-06-30 NOTE — TELEPHONE ENCOUNTER
Rx Refill Note  Requested Prescriptions     Pending Prescriptions Disp Refills    donepezil (ARICEPT) 10 MG tablet [Pharmacy Med Name: DONEPEZIL HCL 10 MG TABLET] 30 tablet 0     Sig: TAKE 1 TABLET BY MOUTH ONCE NIGHTLY **MUST MAKE APPOINTMENT FOR FURTHER REFILLS**      Last filled: 4/4/25 30 days 0 refills with note pt needs appt for further refills, he has since scheduled for next opening in Nov   Last office visit with prescribing clinician: 7/16/2024      Next office visit with prescribing clinician: 11/3/2025     Ernesto Steele MA  06/30/25, 11:07 EDT

## 2025-08-21 ENCOUNTER — TELEPHONE (OUTPATIENT)
Dept: NEUROLOGY | Facility: CLINIC | Age: 85
End: 2025-08-21
Payer: MEDICARE

## 2025-08-21 RX ORDER — PRIMIDONE 250 MG/1
TABLET ORAL
Qty: 225 TABLET | Refills: 0 | Status: SHIPPED | OUTPATIENT
Start: 2025-08-21